# Patient Record
Sex: MALE | Race: OTHER
[De-identification: names, ages, dates, MRNs, and addresses within clinical notes are randomized per-mention and may not be internally consistent; named-entity substitution may affect disease eponyms.]

---

## 2020-04-26 ENCOUNTER — MESSAGE (OUTPATIENT)
Age: 60
End: 2020-04-26

## 2020-05-05 ENCOUNTER — APPOINTMENT (OUTPATIENT)
Dept: DISASTER EMERGENCY | Facility: CLINIC | Age: 60
End: 2020-05-05

## 2020-05-06 ENCOUNTER — APPOINTMENT (OUTPATIENT)
Dept: DISASTER EMERGENCY | Facility: CLINIC | Age: 60
End: 2020-05-06

## 2020-05-07 LAB
SARS-COV-2 IGG SERPL IA-ACNC: 0.1 INDEX
SARS-COV-2 IGG SERPL QL IA: NEGATIVE

## 2021-02-18 PROBLEM — Z00.00 ENCOUNTER FOR PREVENTIVE HEALTH EXAMINATION: Status: ACTIVE | Noted: 2021-02-18

## 2021-11-22 ENCOUNTER — APPOINTMENT (OUTPATIENT)
Dept: UROLOGY | Facility: CLINIC | Age: 61
End: 2021-11-22
Payer: COMMERCIAL

## 2021-11-22 VITALS
HEART RATE: 73 BPM | HEIGHT: 67 IN | WEIGHT: 232 LBS | BODY MASS INDEX: 36.41 KG/M2 | DIASTOLIC BLOOD PRESSURE: 83 MMHG | SYSTOLIC BLOOD PRESSURE: 171 MMHG

## 2021-11-22 DIAGNOSIS — I10 ESSENTIAL (PRIMARY) HYPERTENSION: ICD-10-CM

## 2021-11-22 DIAGNOSIS — E07.9 DISORDER OF THYROID, UNSPECIFIED: ICD-10-CM

## 2021-11-22 DIAGNOSIS — Z78.9 OTHER SPECIFIED HEALTH STATUS: ICD-10-CM

## 2021-11-22 PROCEDURE — 99204 OFFICE O/P NEW MOD 45 MIN: CPT

## 2021-11-22 RX ORDER — LISINOPRIL 30 MG/1
TABLET ORAL
Refills: 0 | Status: ACTIVE | COMMUNITY

## 2021-11-22 RX ORDER — METFORMIN HYDROCHLORIDE 625 MG/1
TABLET ORAL
Refills: 0 | Status: ACTIVE | COMMUNITY

## 2021-11-22 RX ORDER — SIMVASTATIN 80 MG/1
TABLET, FILM COATED ORAL
Refills: 0 | Status: ACTIVE | COMMUNITY

## 2021-11-22 NOTE — PHYSICAL EXAM
[General Appearance - Well Developed] : well developed [General Appearance - Well Nourished] : well nourished [Normal Appearance] : normal appearance [Well Groomed] : well groomed [General Appearance - In No Acute Distress] : no acute distress [Heart Rate And Rhythm] : Heart rate and rhythm were normal [Respiration, Rhythm And Depth] : normal respiratory rhythm and effort [Exaggerated Use Of Accessory Muscles For Inspiration] : no accessory muscle use [Auscultation Breath Sounds / Voice Sounds] : lungs were clear to auscultation bilaterally [Abdomen Soft] : soft [Abdomen Tenderness] : non-tender [Abdomen Hernia] : no hernia was discovered [Costovertebral Angle Tenderness] : no ~M costovertebral angle tenderness [Urethral Meatus] : meatus normal [Penis Abnormality] : normal circumcised penis [Urinary Bladder Findings] : the bladder was normal on palpation [Scrotum] : the scrotum was normal [Testes Tenderness] : no tenderness of the testes [Testes Mass (___cm)] : there were no testicular masses [Prostate Enlargement] : the prostate was not enlarged [Prostate Tenderness] : the prostate was not tender [No Prostate Nodules] : no prostate nodules [Normal Station and Gait] : the gait and station were normal for the patient's age [] : no rash [No Focal Deficits] : no focal deficits [Oriented To Time, Place, And Person] : oriented to person, place, and time [Affect] : the affect was normal [Mood] : the mood was normal [Not Anxious] : not anxious [Femoral Lymph Nodes Enlarged Bilaterally] : femoral [Inguinal Lymph Nodes Enlarged Bilaterally] : inguinal [Epididymis] : the epididymides were normal [FreeTextEntry1] : Dermatophytosis noted to the bilateral inguinal crease.  Peyronie's with corporal atrophy left greater than right.  Digital rectal examination reveals small prostate with intact BC reflex

## 2021-11-22 NOTE — LETTER HEADER
[FreeTextEntry3] : Svitlana Sal M.D.\par Director of Urology\par [facility]\par [address] [place] [address], [address]\[place] [address]

## 2021-11-22 NOTE — LETTER BODY
[Dear  ___] : Dear  [unfilled], [Consult Letter:] : I had the pleasure of evaluating your patient, [unfilled]. [Please see my note below.] : Please see my note below. [Consult Closing:] : Thank you very much for allowing me to participate in the care of this patient.  If you have any questions, please do not hesitate to contact me. [Sincerely,] : Sincerely, [FreeTextEntry2] : Nic Gage MD\par [address] [facility]\[place] [address]

## 2021-11-22 NOTE — ASSESSMENT
[FreeTextEntry1] : He has gynecomastia and is obese.  I do not see estradiol measurements.  He was using AndroGel 2 pumps a day however, is obtaining blood work off of medication.  He has meds left so he will continue on 2 pumps a day and get blood work, including estradiol, on the medication so we can see what his true levels are.  We will adjust accordingly.\par \par Concerning his erectile dysfunction, we will have him obtain [place] criteria and if cleared discuss PDE 5 inhibitors once we are sure we have not had good testosterone level.

## 2021-11-22 NOTE — END OF VISIT
[FreeTextEntry3] : I, Dr. Sal, personally performed the evaluation and management (E/M) services for this new patient.  That E/M includes conducting the initial examination, assessing all conditions, and establishing the plan of care.  Today, my ACP, Deshawn Kelly, was here to observe my evaluation and management services for this patient to be followed going forward

## 2021-11-22 NOTE — HISTORY OF PRESENT ILLNESS
[Erectile Dysfunction] : Erectile Dysfunction [None] : None [FreeTextEntry1] : Chacho is a 61-year-old male, with a history of prediabetes, who was sent for consultation regarding low testosterone and erectile dysfunction.\par \par Approximately 5 years ago patient began experiencing decreased libido/energy with erectile dysfunction.  He presented to his PCP, in [place], at the time.  Blood work demonstrated low testosterone and he was started on AndroGel 2 pumps per day.  Reports significant improvement with his symptoms with a near resolution of his erectile dysfunction and improvement in his libido and general disposition.\par \par When he moved to [place] he noticed, over the past 6 months, increasing difficulty in maintaining an erection.  He said when he eventually obtained an erection it was of decreased rigidity.  He presented to his new PCP who wants him to follow-up with urology regarding his ED and testosterone\par \par He obtained blood work in July however, it was off of testosterone.\par \par He denies any issues with voiding at this time. [Currently Experiencing ___] :  [unfilled] [Urinary Incontinence] : no urinary incontinence [Urinary Retention] : no urinary retention [Urinary Urgency] : no urinary urgency [Urinary Frequency] : no urinary frequency [Nocturia] : no nocturia [Straining] : no straining [Weak Stream] : no weak stream [Intermittency] : no intermittency [Post-Void Dribbling] : no post-void dribbling [Dysuria] : no dysuria [Hematuria - Gross] : no gross hematuria

## 2021-12-24 ENCOUNTER — EMERGENCY (EMERGENCY)
Facility: HOSPITAL | Age: 61
LOS: 0 days | Discharge: HOME | End: 2021-12-24
Attending: EMERGENCY MEDICINE | Admitting: EMERGENCY MEDICINE
Payer: COMMERCIAL

## 2021-12-24 VITALS
RESPIRATION RATE: 18 BRPM | TEMPERATURE: 98 F | HEART RATE: 84 BPM | DIASTOLIC BLOOD PRESSURE: 99 MMHG | SYSTOLIC BLOOD PRESSURE: 164 MMHG | OXYGEN SATURATION: 99 %

## 2021-12-24 DIAGNOSIS — K08.89 OTHER SPECIFIED DISORDERS OF TEETH AND SUPPORTING STRUCTURES: ICD-10-CM

## 2021-12-24 DIAGNOSIS — U07.1 COVID-19: ICD-10-CM

## 2021-12-24 DIAGNOSIS — K04.7 PERIAPICAL ABSCESS WITHOUT SINUS: ICD-10-CM

## 2021-12-24 PROCEDURE — 99284 EMERGENCY DEPT VISIT MOD MDM: CPT

## 2021-12-24 RX ORDER — AMOXICILLIN 250 MG/5ML
1 SUSPENSION, RECONSTITUTED, ORAL (ML) ORAL
Qty: 30 | Refills: 0
Start: 2021-12-24 | End: 2022-01-02

## 2021-12-24 RX ORDER — IBUPROFEN 200 MG
1 TABLET ORAL
Qty: 28 | Refills: 0
Start: 2021-12-24 | End: 2021-12-30

## 2021-12-24 NOTE — ED PROVIDER NOTE - NSFOLLOWUPCLINICS_GEN_ALL_ED_FT
[facility]  Medicine  [address], [place]   Phone: [phone]  Fax:

## 2021-12-24 NOTE — ED PROVIDER NOTE - OBJECTIVE STATEMENT
61 year old male, no significant pmhx presenting with L upper dental pain s/p testing positive for COVID. Pain is achy, non-radiating, worse with chewing, no palliative factors, moderate severity. Denies dyspnea, voice changes, drooling, N/V or any other complaints.

## 2021-12-24 NOTE — CONSULT NOTE ADULT - SUBJECTIVE AND OBJECTIVE BOX
Patient is a 61y old  Male who presents with a chief complaint of dental pain on the upper left that started after he was tested positive for Covid on 12/21.    HPI: Patient reported having previous similar experience of swelling and infection at the same site. He was given antibiotic and the swelling went away.      PAST MEDICAL & SURGICAL HISTORY:    ( -  ) heart valve replacement  ( -  ) joint replacement  ( -  ) pregnancy    MEDICATIONS  (STANDING):    MEDICATIONS  (PRN): Patient reported taking Advil for pain      Allergies: No known drug allergy    *SOCIAL HISTORY: (  - ) Tobacco; ( -  ) ETOH    *Last Dental Visit: unknown    Vital Signs Last 24 Hrs  T(C): 36.6 (24 Dec 2021 09:53), Max: 36.6 (24 Dec 2021 09:53)  T(F): 97.9 (24 Dec 2021 09:53), Max: 97.9 (24 Dec 2021 09:53)  HR: 84 (24 Dec 2021 09:53) (84 - 84)  BP: 164/99 (24 Dec 2021 09:53) (164/99 - 164/99)  BP(mean): --  RR: 18 (24 Dec 2021 09:53) (18 - 18)  SpO2: 99% (24 Dec 2021 09:53) (99% - 99%)      EOE:  TMJ ( -  ) clicks                     ( -  ) pops                     ( -  ) crepitus             Mandible <<FROM>>             Facial bones and MOM <<grossly intact>>             ( -  ) trismus             ( -  ) lymphadenopathy             ( -  ) swelling             ( -  ) asymmetry             ( +  ) palpation: tenderness on the left inferior orbital area             (  - ) dyspnea             (  - ) dysphagia             (  - ) loss of consciousness    IOE:  <<permanen>> dentition: <<multiple missing teeth>>           hard/soft palate:  (  - ) palatal torus, <<No pathology noted>>           tongue/FOM <<No pathology noted>>           labial/buccal mucosa <<No pathology noted>>           ( -  ) percussion           ( +  ) palpation: buccal of #15           ( -  ) swelling            ( +  ) abscess: associated with #15           ( +  ) sinus tract: buccal of #15      *DENTAL RADIOGRAPHS: n/a    *ASSESSMENT: Patient is a 61y old  Male who presents with a chief complaint of dental pain on the upper left that started after he went to get tested on 12/19 and received his test result on 12/21 showing that he is positive for COVID-19. Patient reported having previous similar experience of swelling and infection at the same site. He was given antibiotic and the swelling went away. Extraoral exam noted slight tenderness on palpation at the left inferior orbital area. Intraoral exam noted multiple missing teeth on the upper left area. #15 has existing full coverage crown. Draining abscess noted on the buccal and mesial to #15 with pus expressed upon palpation. Patient has clinically missing teeth anterior and posterior to #15. Explained to the patient that the abscess is draining by itself already. Patient will need to follow up with primary dentist for evaluation and treatment. Patient understands and agrees.    *PLAN: Antibiotic and analgesic medication. Patient to follow up with his primary dentist after recovering from COVID.     RECOMMENDATIONS:  1) Prescribe Amoxicillin and Ibuprofen dosage as per emergency department recommendation.   2) Dental F/U with outpatient dentist for evaluation of offending tooth and comprehensive dental care.   3) If any difficulty swallowing/breathing, fever occur, return to ER.     Rebecca Warner DDS, pager #[phone]

## 2021-12-24 NOTE — ED PROVIDER NOTE - PHYSICAL EXAMINATION
Vital Signs: I have reviewed the initial vital signs.  Constitutional: NAD, well-nourished, appears stated age, no acute distress.  HEENT: Airway patent, moist MM, no erythema/swelling/deformity of oral structures. EOMI, PERRLA. (+) tenderness to tooth #15 with (+) area of fluctuance  CV: regular rate, regular rhythm, well-perfused extremities, 2+ b/l DP and radial pulses equal.  Lungs: BCTA, no increased WOB.  ABD: NTND, no guarding or rebound, no pulsatile mass, no hernias.   MSK: Neck supple, nontender, nl ROM, no stepoff. Chest nontender. Back nontender in TLS spine or to b/l bony structures or flanks. Ext nontender, nl rom, no deformity.   INTEG: Skin warm, dry, no rash.  NEURO: A&Ox3, normal strength, nl sensation throughout, normal speech.   PSYCH: Calm, cooperative, normal affect and interaction.

## 2021-12-24 NOTE — ED PROVIDER NOTE - CLINICAL SUMMARY MEDICAL DECISION MAKING FREE TEXT BOX
Patient presented with atraumatic L upper tooth pain. Otherwise afebrile, HD stable, tolerating PO at home, airway patent, clearing secretions without difficulty, speaking full sentences, no tongue elevation or cervical LAD, uvula midline. (+) abscess on exam. Consulted dental who evaluated patient in ED and performed I+D. Given the above, will discharge home with PO abx and outpatient dental follow up as per dental recs. Patient agreeable with plan. Agrees to return to ED for any new or worsening symptoms.

## 2021-12-24 NOTE — ED PROVIDER NOTE - PATIENT PORTAL LINK FT
You can access the FollowMyHealth Patient Portal offered by [facility] by registering at the following website: [email or web address]. By joining [facility]’s FollowMyHealth portal, you will also be able to view your health information using other applications (apps) compatible with our system.

## 2022-01-24 ENCOUNTER — APPOINTMENT (OUTPATIENT)
Dept: UROLOGY | Facility: CLINIC | Age: 62
End: 2022-01-24
Payer: COMMERCIAL

## 2022-01-24 VITALS
SYSTOLIC BLOOD PRESSURE: 153 MMHG | WEIGHT: 228 LBS | DIASTOLIC BLOOD PRESSURE: 74 MMHG | HEIGHT: 67 IN | BODY MASS INDEX: 35.79 KG/M2 | HEART RATE: 68 BPM

## 2022-01-24 PROCEDURE — 99214 OFFICE O/P EST MOD 30 MIN: CPT

## 2022-01-24 NOTE — ASSESSMENT
[FreeTextEntry1] : His hormone levels on 2 pumps per day are excellent.  We got [place] criteria classification of 1.\par \par We will renew the AndroGel at 2 pumps per day, we will get him a 3-month prescription.\par \par With respect to his ED there are 2 main drugs we use generic Viagra called sildenafil in anywhere from 20 to 100 mg 30 to 60 minutes before sexual activity preferably on an empty stomach the advantage of sildenafil is cost.  The other drug is Cialis AKA tadalafil which can be obtained from compounding pharmacies but is probably 7 times the price.  Its advantages that food does not matter but it has to be taken at least an hour before activity coasted takes a while to get into the bloodstream.  Once in it can last up to 24 or even 36 hours while sildenafil last anywhere from 4 up to 12.  They both have similar risks of nonischemic arterial optic neuropathy, ringing in the ears AKA tinnitus with the possibility of nasal congestion and headache while sildenafil has a high risk of low vision and Cialis has a higher risk of muscle ache.  This is all discussed with him and he chooses to try low-dose sildenafil first as he is concerned with potential side effects.  He will start at the 20 mg dose work his way up as tolerated needed up to 100 mg and when he comes back in he will tell us how he is doing.  If he wants to get more he will come in sooner if not we will see him in 3 months after repeat bloods\par \par As far as his dermatophytosis he tells me the Clotrimazole is working really well we will renew it

## 2022-01-24 NOTE — HISTORY OF PRESENT ILLNESS
[FreeTextEntry1] : Chacho is a 61-year-old male, with a history of prediabetes, who was sent for consultation regarding low testosterone and erectile dysfunction.\par \par Approximately 5 years ago patient began experiencing decreased libido/energy with erectile dysfunction. He presented to his PCP, in [place], at the time. Blood work demonstrated low testosterone and he was started on AndroGel 2 pumps per day. Reports significant improvement with his symptoms with a near resolution of his erectile dysfunction and improvement in his libido and general disposition.\par \par Patient presents to office today to review his blood work and [place] Criteria. \par Patient states that he feels that his erections and libido has decreased due to at home stress. Patient requests medication to help with erections. \par As for AndroGel, patient states he is running low on medication. \par \par Patient denies any urinary complaints. Reports good urinary stream. Denies dysuria and gross hematuria. \par \par Of note, patient was Covid 19 positive around 12/25/2021. Patient reports he currently feels well. \par \par With respect to his dermatophytosis he is doing well with the clotrimazole

## 2022-01-24 NOTE — LETTER BODY
[Dear  ___] : Dear  [unfilled], [Courtesy Letter:] : I had the pleasure of seeing your patient, [unfilled], in my office today. [Please see my note below.] : Please see my note below. [Sincerely,] : Sincerely, [FreeTextEntry2] : Nic Gage MD\par [address] [facility]\[place] [address]

## 2022-01-24 NOTE — LETTER HEADER
[FreeTextEntry3] : Svitlana Sal M.D.\par Director Emeritus of Urology\par [facility]\par [address] [place] [address], [address]\[place] [address]

## 2022-03-23 ENCOUNTER — TRANSCRIPTION ENCOUNTER (OUTPATIENT)
Age: 62
End: 2022-03-23

## 2022-04-25 ENCOUNTER — APPOINTMENT (OUTPATIENT)
Dept: UROLOGY | Facility: CLINIC | Age: 62
End: 2022-04-25
Payer: COMMERCIAL

## 2022-04-25 VITALS
HEART RATE: 71 BPM | DIASTOLIC BLOOD PRESSURE: 85 MMHG | SYSTOLIC BLOOD PRESSURE: 153 MMHG | BODY MASS INDEX: 36.57 KG/M2 | WEIGHT: 233 LBS | HEIGHT: 67 IN

## 2022-04-25 DIAGNOSIS — R73.03 PREDIABETES.: ICD-10-CM

## 2022-04-25 PROCEDURE — 99214 OFFICE O/P EST MOD 30 MIN: CPT

## 2022-04-25 RX ORDER — BENZONATATE 100 MG/1
100 CAPSULE ORAL
Qty: 20 | Refills: 0 | Status: COMPLETED | COMMUNITY
Start: 2021-12-23

## 2022-04-25 RX ORDER — PREDNISONE 20 MG/1
20 TABLET ORAL
Qty: 10 | Refills: 0 | Status: COMPLETED | COMMUNITY
Start: 2021-11-24

## 2022-04-25 RX ORDER — CROMOLYN SODIUM 40 MG/ML
4 SOLUTION/ DROPS OPHTHALMIC
Qty: 10 | Refills: 0 | Status: COMPLETED | COMMUNITY
Start: 2022-03-16

## 2022-04-25 RX ORDER — AMOXICILLIN 500 MG/1
500 TABLET, FILM COATED ORAL
Qty: 30 | Refills: 0 | Status: COMPLETED | COMMUNITY
Start: 2021-12-24

## 2022-04-25 RX ORDER — ONDANSETRON 8 MG/1
8 TABLET, ORALLY DISINTEGRATING ORAL
Qty: 6 | Refills: 0 | Status: COMPLETED | COMMUNITY
Start: 2022-03-23

## 2022-04-25 RX ORDER — IBUPROFEN 600 MG/1
600 TABLET, FILM COATED ORAL
Qty: 28 | Refills: 0 | Status: ACTIVE | COMMUNITY
Start: 2021-12-24

## 2022-04-25 RX ORDER — SILDENAFIL 20 MG/1
20 TABLET ORAL
Qty: 10 | Refills: 0 | Status: COMPLETED | OUTPATIENT
Start: 2022-01-24 | End: 2022-04-25

## 2022-04-25 RX ORDER — CICLOPIROX OLAMINE 7.7 MG/G
0.77 CREAM TOPICAL
Qty: 90 | Refills: 0 | Status: COMPLETED | COMMUNITY
Start: 2021-03-19

## 2022-04-25 RX ORDER — FAMOTIDINE 20 MG/1
20 TABLET, FILM COATED ORAL
Qty: 28 | Refills: 0 | Status: COMPLETED | COMMUNITY
Start: 2022-03-23

## 2022-04-25 RX ORDER — CLINDAMYCIN HYDROCHLORIDE 150 MG/1
150 CAPSULE ORAL
Qty: 21 | Refills: 0 | Status: COMPLETED | COMMUNITY
Start: 2022-01-27

## 2022-04-27 NOTE — HISTORY OF PRESENT ILLNESS
[Currently Experiencing ___] :  [unfilled] [Erectile Dysfunction] : Erectile Dysfunction [None] : None [FreeTextEntry1] : Chacho is a 61-year-old male born November 18, 1960 last seen January 24, 2022.  We have started him on sildenafil he is now taking 3 sometimes 4 of the 20 mg tablets, he says the more he takes a better release he is not having any side effects and as long as he is on the AndroGel, he is taking 2 pumps per day he has a normal libido.  He is still somewhat tired and wants to know if his testosterone levels are normalized.  Finally he has the dermatophytosis in the groin is coming back to warmer weather and he thinks he needs some more the clotrimazole. [Urinary Incontinence] : no urinary incontinence [Urinary Retention] : no urinary retention [Urinary Urgency] : no urinary urgency [Urinary Frequency] : no urinary frequency [Nocturia] : no nocturia [Straining] : no straining [Weak Stream] : no weak stream [Intermittency] : no intermittency [Post-Void Dribbling] : no post-void dribbling [Dysuria] : no dysuria [Hematuria - Gross] : no gross hematuria

## 2022-04-27 NOTE — LETTER HEADER
[FreeTextEntry3] : Svitlana Sal M.D.\par Director Emeritus of Urology\par [facility]\par [address] [place] [address], [address]\[place] [address]

## 2022-04-27 NOTE — ASSESSMENT
[FreeTextEntry1] : His numbers are fine, his fatigue is not hormonal he like to speak to his PCP, his weight is going up and I have cautioned him this is an anabolic steroid he has to watch his diet and do enough exercise to keep the weight off.  As it is his estradiol is a little over the maximum not enough that I would give him pills but it will have to be watched.  As far as his lower extremity edema I discussed that with his PCP.\par \par As far as his ED he is already taking 60 to 80 mg without side effects 100 mg would be a lot cheaper if he gets some side effects he can always shave off a little with a pill cutter.\par \par As far as the dermatophytosis it is beginning to come back a and renewal for the econazole was given

## 2022-04-27 NOTE — LETTER BODY
[Dear  ___] : Dear  [unfilled], [Courtesy Letter:] : I had the pleasure of seeing your patient, [unfilled], in my office today. [Please see my note below.] : Please see my note below. [Sincerely,] : Sincerely, [FreeTextEntry2] : Nic Gage MD\par [address] [facility]\[place] [address]

## 2022-04-27 NOTE — PHYSICAL EXAM
[General Appearance - Well Developed] : well developed [General Appearance - Well Nourished] : well nourished [Normal Appearance] : normal appearance [Well Groomed] : well groomed [General Appearance - In No Acute Distress] : no acute distress [Abdomen Soft] : soft [Abdomen Tenderness] : non-tender [Abdomen Hernia] : no hernia was discovered [Costovertebral Angle Tenderness] : no ~M costovertebral angle tenderness [Heart Rate And Rhythm] : Heart rate and rhythm were normal [] : no respiratory distress [Respiration, Rhythm And Depth] : normal respiratory rhythm and effort [Exaggerated Use Of Accessory Muscles For Inspiration] : no accessory muscle use [Auscultation Breath Sounds / Voice Sounds] : lungs were clear to auscultation bilaterally [Oriented To Time, Place, And Person] : oriented to person, place, and time [Affect] : the affect was normal [Mood] : the mood was normal [Not Anxious] : not anxious [Normal Station and Gait] : the gait and station were normal for the patient's age [FreeTextEntry1] : Moderate edema up to the upper calf

## 2022-08-01 ENCOUNTER — APPOINTMENT (OUTPATIENT)
Dept: UROLOGY | Facility: CLINIC | Age: 62
End: 2022-08-01

## 2022-08-01 VITALS
BODY MASS INDEX: 36.57 KG/M2 | DIASTOLIC BLOOD PRESSURE: 63 MMHG | SYSTOLIC BLOOD PRESSURE: 130 MMHG | WEIGHT: 233 LBS | HEART RATE: 73 BPM | HEIGHT: 67 IN

## 2022-08-01 PROCEDURE — 99214 OFFICE O/P EST MOD 30 MIN: CPT

## 2022-08-01 NOTE — PHYSICAL EXAM
[General Appearance - Well Developed] : well developed [General Appearance - Well Nourished] : well nourished [Normal Appearance] : normal appearance [Well Groomed] : well groomed [General Appearance - In No Acute Distress] : no acute distress [] : no respiratory distress [Respiration, Rhythm And Depth] : normal respiratory rhythm and effort [Exaggerated Use Of Accessory Muscles For Inspiration] : no accessory muscle use [Oriented To Time, Place, And Person] : oriented to person, place, and time [Affect] : the affect was normal [Mood] : the mood was normal [Not Anxious] : not anxious [Normal Station and Gait] : the gait and station were normal for the patient's age [No Focal Deficits] : no focal deficits [FreeTextEntry1] : Edema with venous stasis changes to the lower extremities bilaterally

## 2022-08-01 NOTE — LETTER HEADER
[FreeTextEntry3] : Svitlana Sal M.D.\par Director Emeritus of Urology\par [facility]\par [address] [place] [address], [address]\[place] [address]

## 2022-08-01 NOTE — LETTER BODY
[Dear  ___] : Dear  [unfilled], [Courtesy Letter:] : I had the pleasure of seeing your patient, [unfilled], in my office today. [Please see my note below.] : Please see my note below. [Sincerely,] : Sincerely, [FreeTextEntry2] : Nic Gage MD\par [address] [facility]\[place] [address]

## 2022-08-01 NOTE — ASSESSMENT
[FreeTextEntry1] : He is doing well at 2 pumps a day without adverse events.  His hormones are within normal physiologic range.  He will continue.  He will get blood in 2 and half months follow-up in 3 months for review.  \par \par With respect to his erectile dysfunction, he is doing well on sildenafil and will continue. \par \par Concerning his dermatophytosis, he will restart econazole and follow-up with dermatology.  We reinforced appropriate hygiene.

## 2022-08-01 NOTE — HISTORY OF PRESENT ILLNESS
[Currently Experiencing ___] :  [unfilled] [Erectile Dysfunction] : Erectile Dysfunction [None] : None [FreeTextEntry1] : Chacho is a 61-year-old male, born November 18, 2016\par Last seen on April 25, 2022 who we have been following for recurrent dermatophytosis, ED, and low testosterone.\par \par Currently he is managed on 2 pumps of AndroGel per day.  He reports good efficacy and without adverse events.\par \par Additionally he is taking 100 mg of sildenafil with good response.  He denies any adverse events associated.\par \par He has a history of recurrent dermatophytosis, managed with econazole, and he states his rash has now returned.  At his last visit we recommended dermatological consultation.  When he uses the econazole the rash goes away but when he stops it comes back please try keeping it clean washing it often but especially in this weather and the fact that he tends to sweat a lot it comes back.  He would like to know what else he can get\par  [Urinary Incontinence] : no urinary incontinence [Urinary Retention] : no urinary retention [Urinary Urgency] : no urinary urgency [Urinary Frequency] : no urinary frequency [Nocturia] : no nocturia [Straining] : no straining [Weak Stream] : no weak stream [Intermittency] : no intermittency [Post-Void Dribbling] : no post-void dribbling [Dysuria] : no dysuria [Hematuria - Gross] : no gross hematuria

## 2022-10-24 ENCOUNTER — APPOINTMENT (OUTPATIENT)
Dept: OTOLARYNGOLOGY | Facility: CLINIC | Age: 62
End: 2022-10-24

## 2022-10-24 DIAGNOSIS — H91.93 UNSPECIFIED HEARING LOSS, BILATERAL: ICD-10-CM

## 2022-10-24 PROCEDURE — 99204 OFFICE O/P NEW MOD 45 MIN: CPT | Mod: 25

## 2022-10-24 PROCEDURE — 31231 NASAL ENDOSCOPY DX: CPT

## 2022-10-24 NOTE — HISTORY OF PRESENT ILLNESS
[de-identified] : Patient presents clogged ears .   Has   ear discomfort  when  sun hits   ear .   Allergies ,  he is  on Flonase . Has been having nasal  congestion .  His symptoms have been going on for over a year. \par Occasionally has  been getting  dizziness and lightheaded for over a year .  Room starts spinning . \par He take flonase which helps.

## 2022-10-24 NOTE — ASSESSMENT
[FreeTextEntry1] : Audio next visit.\par \par I discussed the benefits of oral steroids for patient's current situation, and made the patient aware of side effects of systemic corticosteroids. I recommended a low sugar and low salt diet while on steroid treatment, while alerting patient about potential temporary increase in sugar levels and blood pressure. Patient aware of risk of impact on sleep quality and mood temporarily while on the medication.\par Patient to touch bases with PCP regarding oral steroids to check if needs to change dose of diabetes meds. \par

## 2022-10-25 ENCOUNTER — RESULT REVIEW (OUTPATIENT)
Age: 62
End: 2022-10-25

## 2022-11-04 ENCOUNTER — OUTPATIENT (OUTPATIENT)
Dept: OUTPATIENT SERVICES | Facility: HOSPITAL | Age: 62
LOS: 1 days | Discharge: HOME | End: 2022-11-04

## 2022-11-04 DIAGNOSIS — J33.9 NASAL POLYP, UNSPECIFIED: ICD-10-CM

## 2022-11-04 PROCEDURE — 70487 CT MAXILLOFACIAL W/DYE: CPT | Mod: 26

## 2022-11-09 ENCOUNTER — APPOINTMENT (OUTPATIENT)
Dept: UROLOGY | Facility: CLINIC | Age: 62
End: 2022-11-09

## 2022-11-09 VITALS
HEART RATE: 66 BPM | WEIGHT: 230 LBS | BODY MASS INDEX: 36.1 KG/M2 | HEIGHT: 67 IN | SYSTOLIC BLOOD PRESSURE: 144 MMHG | DIASTOLIC BLOOD PRESSURE: 66 MMHG

## 2022-11-09 PROCEDURE — 99214 OFFICE O/P EST MOD 30 MIN: CPT

## 2022-11-09 NOTE — LETTER HEADER
[FreeTextEntry3] : Svitlana Sal M.D.\par Director Emeritus of Urology\par [facility]\par [address] [place] [address], [address]\[place] [address]

## 2022-11-09 NOTE — ASSESSMENT
[FreeTextEntry1] : Blood work demonstrates normal physiologic levels.  But they did not want everything we wanted.  He is doing well we will continue sildenafil and testosterone.  He will obtain blood work and follow-up in 3 months.

## 2022-11-09 NOTE — HISTORY OF PRESENT ILLNESS
[Currently Experiencing ___] :  [unfilled] [Erectile Dysfunction] : Erectile Dysfunction [None] : None [FreeTextEntry1] : Chacho is a 61-year-old male born November 18, 1960 last seen on August 1, 2022 we have been following for low testosterone and erectile dysfunction.\par \par Currently he is managed on 2 pumps of AndroGel per day.  He reports good efficacy and without adverse events.\par \par Additionally he is taking 100 mg of sildenafil when the opportunity arises, with good efficacy without any adverse events.  [Urinary Incontinence] : no urinary incontinence [Urinary Retention] : no urinary retention [Urinary Urgency] : no urinary urgency [Urinary Frequency] : no urinary frequency [Nocturia] : no nocturia [Straining] : no straining [Weak Stream] : no weak stream [Intermittency] : no intermittency [Post-Void Dribbling] : no post-void dribbling [Dysuria] : no dysuria [Hematuria - Gross] : no gross hematuria

## 2022-11-09 NOTE — END OF VISIT
[FreeTextEntry3] : I, Dr. Sal, personally performed the evaluation and management (E/M) services for this established patient who presents today with (a) new problem(s)/exacerbation of (an) existing condition(s).  That E/M includes conducting the examination, assessing all new/exacerbated conditions, and establishing a new plan of care.  Today, my ACP, Deshawn Kelly was here to observe my evaluation and management services for this new problem/exacerbated condition to be followed going forward.

## 2022-11-09 NOTE — PHYSICAL EXAM
[General Appearance - Well Developed] : well developed [General Appearance - Well Nourished] : well nourished [Normal Appearance] : normal appearance [Well Groomed] : well groomed [General Appearance - In No Acute Distress] : no acute distress [] : no respiratory distress [Respiration, Rhythm And Depth] : normal respiratory rhythm and effort [Exaggerated Use Of Accessory Muscles For Inspiration] : no accessory muscle use [Oriented To Time, Place, And Person] : oriented to person, place, and time [Affect] : the affect was normal [Mood] : the mood was normal [Not Anxious] : not anxious [Normal Station and Gait] : the gait and station were normal for the patient's age [No Focal Deficits] : no focal deficits [FreeTextEntry1] : Mild lower extremity edema

## 2022-11-09 NOTE — LETTER BODY
[Dear  ___] : Dear  [unfilled], [Courtesy Letter:] : I had the pleasure of seeing your patient, [unfilled], in my office today. [Please see my note below.] : Please see my note below. [Sincerely,] : Sincerely, [FreeTextEntry2] : Nic Gage MD\par [address] [facility]\[place] [address]

## 2022-11-16 ENCOUNTER — RX RENEWAL (OUTPATIENT)
Age: 62
End: 2022-11-16

## 2022-11-16 ENCOUNTER — APPOINTMENT (OUTPATIENT)
Dept: OTOLARYNGOLOGY | Facility: CLINIC | Age: 62
End: 2022-11-16

## 2022-11-16 VITALS — WEIGHT: 230 LBS | BODY MASS INDEX: 36.1 KG/M2 | HEIGHT: 67 IN

## 2022-11-16 DIAGNOSIS — J34.89 OTHER SPECIFIED DISORDERS OF NOSE AND NASAL SINUSES: ICD-10-CM

## 2022-11-16 DIAGNOSIS — J33.9 NASAL POLYP, UNSPECIFIED: ICD-10-CM

## 2022-11-16 PROCEDURE — 31231 NASAL ENDOSCOPY DX: CPT

## 2022-11-16 PROCEDURE — 99214 OFFICE O/P EST MOD 30 MIN: CPT | Mod: 25

## 2022-11-16 NOTE — REASON FOR VISIT
[Subsequent Evaluation] : a subsequent evaluation for [FreeTextEntry2] : nasal polyposis, nasal obstruction, bilateral hearing loss

## 2022-11-16 NOTE — HISTORY OF PRESENT ILLNESS
[FreeTextEntry1] : Patient returns today following up on nasal polyposis, nasal obstruction, bilateral hearing loss.  Patient is feeling a little better. He can breathe better in the cold ear.  He went for a CT Maxillofacial 11/04/22.  He is here for the results.

## 2022-11-16 NOTE — ASSESSMENT
[FreeTextEntry1] : I personally reviewed, interpreted and discussed patient's CT images. polyps and chronic sinusitis.\par \par Discussed surgery, vs dupixent, vs budesonide. Pros and cons and risks explained. \par \par \par RTC in 2M

## 2023-01-04 ENCOUNTER — NON-APPOINTMENT (OUTPATIENT)
Age: 63
End: 2023-01-04

## 2023-01-18 ENCOUNTER — APPOINTMENT (OUTPATIENT)
Dept: OTOLARYNGOLOGY | Facility: CLINIC | Age: 63
End: 2023-01-18

## 2023-02-13 ENCOUNTER — RX RENEWAL (OUTPATIENT)
Age: 63
End: 2023-02-13

## 2023-02-13 ENCOUNTER — APPOINTMENT (OUTPATIENT)
Dept: UROLOGY | Facility: CLINIC | Age: 63
End: 2023-02-13
Payer: COMMERCIAL

## 2023-02-13 VITALS
WEIGHT: 228 LBS | TEMPERATURE: 97.4 F | RESPIRATION RATE: 18 BRPM | DIASTOLIC BLOOD PRESSURE: 80 MMHG | HEART RATE: 79 BPM | BODY MASS INDEX: 35.79 KG/M2 | HEIGHT: 67 IN | OXYGEN SATURATION: 98 % | SYSTOLIC BLOOD PRESSURE: 159 MMHG

## 2023-02-13 DIAGNOSIS — N52.9 MALE ERECTILE DYSFUNCTION, UNSPECIFIED: ICD-10-CM

## 2023-02-13 PROCEDURE — 99214 OFFICE O/P EST MOD 30 MIN: CPT

## 2023-02-13 RX ORDER — CLOTRIMAZOLE 10 MG/G
1 CREAM TOPICAL TWICE DAILY
Qty: 1 | Refills: 1 | Status: ACTIVE | COMMUNITY
Start: 2021-11-22 | End: 1900-01-01

## 2023-02-13 RX ORDER — BUDESONIDE 0.5 MG/2ML
0.5 INHALANT ORAL
Qty: 180 | Refills: 0 | Status: ACTIVE | COMMUNITY
Start: 2022-11-16 | End: 1900-01-01

## 2023-02-13 NOTE — PHYSICAL EXAM
[General Appearance - Well Developed] : well developed [General Appearance - Well Nourished] : well nourished [Normal Appearance] : normal appearance [Well Groomed] : well groomed [General Appearance - In No Acute Distress] : no acute distress [] : no respiratory distress [Respiration, Rhythm And Depth] : normal respiratory rhythm and effort [Exaggerated Use Of Accessory Muscles For Inspiration] : no accessory muscle use [Oriented To Time, Place, And Person] : oriented to person, place, and time [Affect] : the affect was normal [Mood] : the mood was normal [Not Anxious] : not anxious [Normal Station and Gait] : the gait and station were normal for the patient's age [No Focal Deficits] : no focal deficits [FreeTextEntry1] : Bilateral lower extremity edema, mild

## 2023-02-13 NOTE — LETTER BODY
[Dear  ___] : Dear  [unfilled], [Courtesy Letter:] : I had the pleasure of seeing your patient, [unfilled], in my office today. [Please see my note below.] : Please see my note below. [Sincerely,] : Sincerely, [FreeTextEntry2] : Nic Gage MD\par [address] [facility]\[place] [address]

## 2023-02-13 NOTE — HISTORY OF PRESENT ILLNESS
[Currently Experiencing ___] :  [unfilled] [Erectile Dysfunction] : Erectile Dysfunction [None] : None [FreeTextEntry1] : Chacho is a 62-year-old male, born 1960 last seen 11/09/2022 who we have been following for low testosterone and erectile dysfunction.\par \par Currently he is managed on 2 pumps a day of AndroGel with good efficacy and without adverse effects.  He presents today to review his blood work however, he obtained his blood work 2 to 3 days off of medication as he had a sinus infection and decided he should not take the TRT when he had the sinus infection (???).\par \par Additionally he is managed with sildenafil 100 mg when needed, with good efficacy without adverse events [Urinary Incontinence] : no urinary incontinence [Urinary Retention] : no urinary retention [Urinary Urgency] : no urinary urgency [Urinary Frequency] : no urinary frequency [Nocturia] : no nocturia [Straining] : no straining [Weak Stream] : no weak stream [Intermittency] : no intermittency [Post-Void Dribbling] : no post-void dribbling [Dysuria] : no dysuria [Hematuria - Gross] : no gross hematuria

## 2023-02-13 NOTE — LETTER HEADER
[FreeTextEntry3] : Svitlana Sal M.D.\par Director Emeritus of Urology\par [facility]\par [address] [place] [address], [address]\[place] [address]

## 2023-02-13 NOTE — ASSESSMENT
[FreeTextEntry1] : His blood work is off of testosterone gel, as he was not feeling well when he obtained it.  We will resend the gel and he will continue 2 pumps a day.  Get blood work in 6 weeks and follow-up for review\par We discussed that he should not stop the medication when he has confounding disease processes without talking to his doctors

## 2023-05-15 ENCOUNTER — APPOINTMENT (OUTPATIENT)
Dept: UROLOGY | Facility: CLINIC | Age: 63
End: 2023-05-15
Payer: COMMERCIAL

## 2023-05-15 VITALS
DIASTOLIC BLOOD PRESSURE: 61 MMHG | HEART RATE: 70 BPM | WEIGHT: 224 LBS | HEIGHT: 67 IN | SYSTOLIC BLOOD PRESSURE: 112 MMHG | BODY MASS INDEX: 35.16 KG/M2

## 2023-05-15 DIAGNOSIS — Z87.891 PERSONAL HISTORY OF NICOTINE DEPENDENCE: ICD-10-CM

## 2023-05-15 DIAGNOSIS — Z78.9 OTHER SPECIFIED HEALTH STATUS: ICD-10-CM

## 2023-05-15 PROCEDURE — 99214 OFFICE O/P EST MOD 30 MIN: CPT

## 2023-05-15 RX ORDER — FLUTICASONE PROPIONATE 50 UG/1
50 SPRAY, METERED NASAL
Qty: 16 | Refills: 0 | Status: ACTIVE | COMMUNITY
Start: 2023-03-07

## 2023-05-15 RX ORDER — LEVOTHYROXINE SODIUM 150 UG/1
150 TABLET ORAL
Qty: 90 | Refills: 0 | Status: ACTIVE | COMMUNITY
Start: 2023-03-27

## 2023-05-15 RX ORDER — NIFEDIPINE 60 MG/1
60 TABLET, FILM COATED, EXTENDED RELEASE ORAL
Qty: 90 | Refills: 0 | Status: COMPLETED | COMMUNITY
Start: 2021-06-12 | End: 2023-05-15

## 2023-05-15 RX ORDER — TESTOSTERONE 10 MG/G
GEL TRANSDERMAL
Refills: 0 | Status: COMPLETED | COMMUNITY
End: 2023-05-15

## 2023-05-15 RX ORDER — NIFEDIPINE 90 MG/1
90 TABLET, EXTENDED RELEASE ORAL
Qty: 90 | Refills: 0 | Status: ACTIVE | COMMUNITY
Start: 2023-03-27

## 2023-05-15 RX ORDER — CEFDINIR 300 MG/1
300 CAPSULE ORAL
Qty: 14 | Refills: 0 | Status: ACTIVE | COMMUNITY
Start: 2023-03-20

## 2023-05-15 RX ORDER — LEVOTHYROXINE SODIUM 175 UG/1
175 TABLET ORAL
Qty: 90 | Refills: 0 | Status: COMPLETED | COMMUNITY
Start: 2022-02-08 | End: 2023-05-15

## 2023-05-15 RX ORDER — PROMETHAZINE HYDROCHLORIDE AND DEXTROMETHORPHAN HYDROBROMIDE ORAL SOLUTION 15; 6.25 MG/5ML; MG/5ML
6.25-15 SOLUTION ORAL
Qty: 140 | Refills: 0 | Status: ACTIVE | COMMUNITY
Start: 2023-01-05

## 2023-05-15 RX ORDER — METHYLPREDNISOLONE 2 MG/1
2 TABLET ORAL DAILY
Qty: 3 | Refills: 0 | Status: COMPLETED | COMMUNITY
Start: 2022-10-24 | End: 2023-05-15

## 2023-05-15 RX ORDER — ACETAMINOPHEN AND CODEINE PHOSPHATE 300; 30 MG/1; MG/1
300-30 TABLET ORAL
Qty: 30 | Refills: 0 | Status: ACTIVE | COMMUNITY
Start: 2023-03-20

## 2023-05-15 RX ORDER — TOBRAMYCIN 3 MG/ML
0.3 SOLUTION/ DROPS OPHTHALMIC
Qty: 5 | Refills: 0 | Status: COMPLETED | COMMUNITY
Start: 2023-03-27

## 2023-05-15 RX ORDER — NAPROXEN 500 MG/1
500 TABLET ORAL
Qty: 60 | Refills: 0 | Status: COMPLETED | COMMUNITY
Start: 2022-03-09 | End: 2023-05-15

## 2023-05-15 NOTE — LETTER BODY
[Dear  ___] : Dear  [unfilled], [Courtesy Letter:] : I had the pleasure of seeing your patient, [unfilled], in my office today. [Please see my note below.] : Please see my note below. [Sincerely,] : Sincerely, [FreeTextEntry2] : Nic Gage MD\par [address] [facility]\[place] [address]

## 2023-05-15 NOTE — ASSESSMENT
[FreeTextEntry1] : His numbers and his exam is without change his fatigue is not due to hormonal issues and also collateral with his PCP.  From my viewpoint he will stay on medication and see us in 3 months with bloods before

## 2023-05-15 NOTE — PHYSICAL EXAM
[General Appearance - Well Developed] : well developed [General Appearance - Well Nourished] : well nourished [Normal Appearance] : normal appearance [Well Groomed] : well groomed [General Appearance - In No Acute Distress] : no acute distress [Abdomen Soft] : soft [Abdomen Tenderness] : non-tender [Costovertebral Angle Tenderness] : no ~M costovertebral angle tenderness [Heart Rate And Rhythm] : Heart rate and rhythm were normal [FreeTextEntry1] : Moderate edema up to the upper calf [] : no respiratory distress [Respiration, Rhythm And Depth] : normal respiratory rhythm and effort [Exaggerated Use Of Accessory Muscles For Inspiration] : no accessory muscle use [Normal Station and Gait] : the gait and station were normal for the patient's age [No Focal Deficits] : no focal deficits

## 2023-05-15 NOTE — HISTORY OF PRESENT ILLNESS
[FreeTextEntry1] : Chacho is a 62-year-old male, born 1960 last seen February 13, 2023 who we have been following for low testosterone and erectile dysfunction.\par \par Currently he is managed on 2 pumps a day of AndroGel with good efficacy and without adverse effects.  He presents today to review his blood work this time he got the blood work while on the medication.  He tells me that he feels better but he still tired and he wonders if the medication is working.  He understands that there could be other reasons for being tired he just wants to know if the testosterone is it. \par \par Good as far as his sex life his wife is overworked, his mother is in assisted living and they just have not tried.  The last time he tried over a month ago his penis did work but this is just another thing for him to worry about. [Currently Experiencing ___] :  [unfilled] [Urinary Incontinence] : no urinary incontinence [Urinary Retention] : no urinary retention [Urinary Urgency] : no urinary urgency [Urinary Frequency] : no urinary frequency [Nocturia] : no nocturia [Straining] : no straining [Weak Stream] : no weak stream [Intermittency] : no intermittency [Post-Void Dribbling] : no post-void dribbling [Erectile Dysfunction] : Erectile Dysfunction [Dysuria] : no dysuria [Hematuria - Gross] : no gross hematuria [Fatigue] : fatigue [None] : None

## 2023-05-15 NOTE — LETTER HEADER
[FreeTextEntry3] : Svitlana Sal M.D.\par Director Emeritus of Urology\par [facility]\par [address] [place] [address], [address]\[place] [address]

## 2023-08-28 ENCOUNTER — APPOINTMENT (OUTPATIENT)
Dept: UROLOGY | Facility: CLINIC | Age: 63
End: 2023-08-28
Payer: COMMERCIAL

## 2023-08-28 VITALS
HEIGHT: 67 IN | TEMPERATURE: 98 F | HEART RATE: 70 BPM | SYSTOLIC BLOOD PRESSURE: 129 MMHG | WEIGHT: 199 LBS | BODY MASS INDEX: 31.23 KG/M2 | DIASTOLIC BLOOD PRESSURE: 64 MMHG

## 2023-08-28 PROCEDURE — 99214 OFFICE O/P EST MOD 30 MIN: CPT

## 2023-08-28 NOTE — PHYSICAL EXAM
[General Appearance - Well Developed] : well developed [General Appearance - Well Nourished] : well nourished [Normal Appearance] : normal appearance [Well Groomed] : well groomed [General Appearance - In No Acute Distress] : no acute distress [Abdomen Soft] : soft [Abdomen Tenderness] : non-tender [Costovertebral Angle Tenderness] : no ~M costovertebral angle tenderness [Heart Rate And Rhythm] : Heart rate and rhythm were normal [FreeTextEntry1] : Moderate edema up to the upper calf no chnage [] : no respiratory distress [Respiration, Rhythm And Depth] : normal respiratory rhythm and effort [Exaggerated Use Of Accessory Muscles For Inspiration] : no accessory muscle use [Oriented To Time, Place, And Person] : oriented to person, place, and time [Affect] : the affect was normal [Mood] : the mood was normal [Not Anxious] : not anxious [Normal Station and Gait] : the gait and station were normal for the patient's age

## 2023-08-28 NOTE — LETTER BODY
[Dear  ___] : Dear  [unfilled], [Courtesy Letter:] : I had the pleasure of seeing your patient, [unfilled], in my office today. [Please see my note below.] : Please see my note below. [Sincerely,] : Sincerely, [FreeTextEntry2] : Delphine Garcia MD [address] [place] [id number]

## 2023-08-28 NOTE — ASSESSMENT
[FreeTextEntry1] : His hormones are normal, his exam is stable, he wants to retry the sildenafil at the 100 mg as he is not sure if he ever filled that prescription if this does not work we can try Cialis.  He has no interest in self injection and definitely no interest in surgery.  If things go well we will keep him on the current protocol we will reorder the AndroGel once in 2-1/2 months and see me in 3.  If he decides the sildenafil is not to his liking and he wants to try the Cialis he will let us know  Risk benefits of the sildenafil as well of testosterone replacement were reviewed he had no questions

## 2023-08-28 NOTE — LETTER HEADER
[FreeTextEntry3] : Svitlana Sal MD  [address] [place] [address], [address] [place] [id number]

## 2023-08-28 NOTE — HISTORY OF PRESENT ILLNESS
[FreeTextEntry1] : Chacho is a 62-year-old male born November 18, 1960 last seen May 15, 2023.  We will follow him Dermatophytosis for which she uses econazole he says it is working given that he was in [place] for part of the summer he needed it a little more than some and he wants a refill  He has low testosterone being treated with 2 pumps per day of AndroGel he had bloods done on August 19 and is here for review and refill  Finally, when he has testosterone on board he is using sildenafil at the 100 mg dose is not working and he wants to know options. He has not tried cialis..  He tells me there has been no changes in his medical conditions and in general he feels good  Blood test were done August 19, 2023 with him on 2 pumps per day Total testosterone was 397 Free testosterone was 150 Bioavailable testosterone was 216 Sex hormone binding globulin of 11 explains the relatively low total testosterone   Function tests were normal Hemoglobin was 14.3 with a platelet count of 381,000 Estradiol was 23

## 2023-12-21 ENCOUNTER — APPOINTMENT (OUTPATIENT)
Dept: UROLOGY | Facility: CLINIC | Age: 63
End: 2023-12-21
Payer: COMMERCIAL

## 2023-12-21 VITALS
BODY MASS INDEX: 35.94 KG/M2 | TEMPERATURE: 97.2 F | SYSTOLIC BLOOD PRESSURE: 125 MMHG | WEIGHT: 229 LBS | DIASTOLIC BLOOD PRESSURE: 70 MMHG | HEIGHT: 67 IN | HEART RATE: 74 BPM

## 2023-12-21 DIAGNOSIS — B35.6 TINEA CRURIS: ICD-10-CM

## 2023-12-21 DIAGNOSIS — E66.09 OTHER OBESITY DUE TO EXCESS CALORIES: ICD-10-CM

## 2023-12-21 PROCEDURE — 99214 OFFICE O/P EST MOD 30 MIN: CPT

## 2023-12-21 RX ORDER — SILDENAFIL 100 MG/1
100 TABLET, FILM COATED ORAL
Qty: 20 | Refills: 1 | Status: ACTIVE | OUTPATIENT
Start: 2022-04-25

## 2023-12-21 NOTE — LETTER BODY
[Dear  ___] : Dear  [unfilled], [Courtesy Letter:] : I had the pleasure of seeing your patient, [unfilled], in my office today. [Please see my note below.] : Please see my note below. [Sincerely,] : Sincerely, [FreeTextEntry2] : Delphine Garcia MD [address] [place], [place] [id number]

## 2023-12-21 NOTE — PHYSICAL EXAM
[General Appearance - Well Developed] : well developed [General Appearance - Well Nourished] : well nourished [Normal Appearance] : normal appearance [Well Groomed] : well groomed [General Appearance - In No Acute Distress] : no acute distress [Heart Rate And Rhythm] : heart rate and rhythm were normal [Edema] : no peripheral edema [Respiration, Rhythm And Depth] : normal respiratory rhythm and effort [Exaggerated Use Of Accessory Muscles For Inspiration] : no accessory muscle use [Auscultation Breath Sounds / Voice Sounds] : lungs were clear to auscultation bilaterally [Abdomen Soft] : soft [Abdomen Tenderness] : non-tender [Costovertebral Angle Tenderness] : no ~M costovertebral angle tenderness [Normal Station and Gait] : the gait and station were normal for the patient's age [] : no rash [No Focal Deficits] : no focal deficits [Oriented To Time, Place, And Person] : oriented to person, place, and time [Affect] : the affect was normal [Mood] : the mood was normal [Not Anxious] : not anxious [No Palpable Adenopathy] : no palpable adenopathy [de-identified] : Fields of View were normal

## 2023-12-21 NOTE — HISTORY OF PRESENT ILLNESS
Subjective:       Patient ID: Angeles Guerin is a 20 y.o. female.    Chief Complaint: No chief complaint on file.  Angeles is a 18 year old female referred for a second opinion about Diffuse Large B Cell Lymphoma    Initial visit: She was in her usual health until approximately 8 months ago when she started complaining of left knee pain.  Initially thought to be msk in origin and was treated with supportive care.  About two months ago the pain started to worsen which inevitably led to an xray and then MRI of her left femur which showed a mass in her distal femur.  [place] [facility] [place].  Biopsy of the lesion was c/w DLBCL.  She had a CT of her neck, chest, abdomen , and pelvis which did not show any evidence of lymphoma.  Referred here by her oncologist at [facility] for a second opinion         S/p 6 cycles of R-CHOP.  Pt here with dad.   She has done very well since last visit,    No new issues.   Doing well in college  Denies any fevers, weight loss, night sweats.   Menstraul periods normalized on ocps    Follow-up  Pertinent negatives include no chest pain, congestion, coughing, fatigue, fever, headaches, nausea, neck pain, rash, vomiting or weakness.   Review of Systems   Constitutional:  Negative for activity change, appetite change, fatigue and fever.   HENT:  Negative for congestion, hearing loss, mouth sores, nosebleeds, rhinorrhea and sneezing.    Eyes:  Negative for photophobia and visual disturbance.   Respiratory:  Negative for cough, chest tightness, shortness of breath and wheezing.    Cardiovascular:  Negative for chest pain, palpitations and leg swelling.   Gastrointestinal:  Negative for abdominal distention, blood in stool, constipation, diarrhea, nausea and vomiting.   Genitourinary:  Negative for difficulty urinating, hematuria, menstrual problem and pelvic pain.   Musculoskeletal:  Negative for gait problem and neck pain.   Skin:  Negative for pallor and rash.    Neurological:  Negative for dizziness, weakness, light-headedness and headaches.   Hematological:  Negative for adenopathy. Does not bruise/bleed easily.   Psychiatric/Behavioral:  Negative for behavioral problems.      Objective:      Physical Exam  Constitutional:       Appearance: She is well-developed.   HENT:      Head: Normocephalic and atraumatic.      Right Ear: External ear normal.      Left Ear: External ear normal.      Nose: Nose normal.   Eyes:      Pupils: Pupils are equal, round, and reactive to light.   Cardiovascular:      Rate and Rhythm: Normal rate and regular rhythm.      Heart sounds: Normal heart sounds. No murmur heard.    No friction rub. No gallop.   Pulmonary:      Effort: No respiratory distress.      Breath sounds: Normal breath sounds. No wheezing or rales.   Chest:      Chest wall: No tenderness.   Abdominal:      General: Bowel sounds are normal. There is no distension.      Palpations: Abdomen is soft. There is no mass.      Tenderness: There is no abdominal tenderness. There is no guarding or rebound.   Musculoskeletal:         General: No tenderness. Normal range of motion.      Cervical back: Normal range of motion and neck supple.   Lymphadenopathy:      Cervical: No cervical adenopathy.   Skin:     General: Skin is warm and dry.      Coloration: Skin is not pale.      Findings: No erythema or rash.   Neurological:      Mental Status: She is alert and oriented to person, place, and time.      Cranial Nerves: No cranial nerve deficit.       Assessment:       No diagnosis found.    Plan:       17yo female with DLBCL of the bone  PErsonally reviewed CT neck c/a/p. Bone scan, PET scan.  No evidence of disease outside the femur lesion  CSF negative  Bone marrow negative  Our pathologist reviewed path and agrees that this is DLBCL although cannot subtype it nor can they do genetic studies given decalcified specimend  Finished therapy 9/2017  Total Cytoxan dose: 4500 mg/m2  Total  [Currently Experiencing ___] :  [unfilled] Anthracycline dose:  300 mg/m2       Restaging MRI  Negative   Counts good   No symptoms  ESR nl            RTC in 1 yr  PE and labs                 I spent 30min with family >50% in counseling                         [Erectile Dysfunction] : Erectile Dysfunction [Fatigue] : fatigue [None] : None [FreeTextEntry1] : Chacho is a 62-year-old male born November 18, 1960 last seen  08/28/2023 We follow him Dermatophytosis for which he uses econazole he says it is better in the winter, but he has the drug if he needs it. He has low testosterone being treated with 2 pumps per day of AndroGel he had bloods done on August 19 and is here for review and refill  Finally, when he has testosterone on board he is using sildenafil at the 100 mg dose is not working and he wants to know options. He tells me there has been no changes in his medical conditions and in general he feels good  Blood test were done November 25, 2023 with him at 2 pumps per day Liver function tests are normal Hemoglobin was 14.6 with a platelet count of 397,000 Total testosterone was 625 Free testosterone was 187 Bioavailable testosterone was 376 Sex hormone binding globulin was 11 with albumin of 4.4 LH was undetectable Estradiol was 47 PSA was 1.4/21%  [Urinary Incontinence] : no urinary incontinence [Urinary Retention] : no urinary retention [Urinary Urgency] : no urinary urgency [Urinary Frequency] : no urinary frequency [Nocturia] : no nocturia [Straining] : no straining [Weak Stream] : no weak stream [Intermittency] : no intermittency [Post-Void Dribbling] : no post-void dribbling [Dysuria] : no dysuria [Hematuria - Gross] : no gross hematuria

## 2023-12-21 NOTE — LETTER HEADER
[FreeTextEntry3] : Svitlana Sal MD [address] [place] [address], [address] [place] [phone]

## 2023-12-21 NOTE — ASSESSMENT
[FreeTextEntry1] : His numbers are good except for the estradiol which is not that bad we will let it ride.  His exam is benign and we will renew the medication and see him in 3 months

## 2024-02-20 ENCOUNTER — NON-APPOINTMENT (OUTPATIENT)
Age: 64
End: 2024-02-20

## 2024-02-25 ENCOUNTER — NON-APPOINTMENT (OUTPATIENT)
Age: 64
End: 2024-02-25

## 2024-04-11 ENCOUNTER — APPOINTMENT (OUTPATIENT)
Dept: UROLOGY | Facility: CLINIC | Age: 64
End: 2024-04-11
Payer: COMMERCIAL

## 2024-04-11 VITALS
HEART RATE: 74 BPM | OXYGEN SATURATION: 98 % | BODY MASS INDEX: 35.94 KG/M2 | WEIGHT: 229 LBS | DIASTOLIC BLOOD PRESSURE: 78 MMHG | HEIGHT: 67 IN | SYSTOLIC BLOOD PRESSURE: 143 MMHG | TEMPERATURE: 97.1 F

## 2024-04-11 DIAGNOSIS — R79.89 OTHER SPECIFIED ABNORMAL FINDINGS OF BLOOD CHEMISTRY: ICD-10-CM

## 2024-04-11 PROCEDURE — G2211 COMPLEX E/M VISIT ADD ON: CPT

## 2024-04-11 PROCEDURE — 99214 OFFICE O/P EST MOD 30 MIN: CPT

## 2024-04-11 RX ORDER — ECONAZOLE NITRATE 10 MG/G
1 CREAM TOPICAL TWICE DAILY
Qty: 1 | Refills: 0 | Status: ACTIVE | COMMUNITY
Start: 2021-05-28 | End: 1900-01-01

## 2024-04-11 RX ORDER — TESTOSTERONE 16.2 MG/G
20.25 MG/ACT GEL TRANSDERMAL
Qty: 3 | Refills: 0 | Status: ACTIVE | COMMUNITY
Start: 2022-01-24 | End: 1900-01-01

## 2024-04-11 NOTE — ASSESSMENT
[FreeTextEntry1] : I do not understand that his blood send November had an excellent result and now it is if he is not using the medication.  He tells me he put the medication on went and did some work in the office and then went and got the bloods and he should have had a good level.  We will get him a new prescription perhaps it was just a bad bottle as on the current medication he is not as horny as he was when I saw him a few months ago.  He will get blood done in about 2 weeks and see me after the bloods are done.  If it is still low we will have to consider other options

## 2024-04-11 NOTE — HISTORY OF PRESENT ILLNESS
[FreeTextEntry1] : Chacho is a 63-year-old male born November 18, 1960 last seen  12/21/2023 His Dermatophytosis has recurred and he needs more econazole.  He has low testosterone being treated with 2 pumps per day of AndroGel he had bloods done on 04/01/2024 and is here for review and refill  Finally, even when he has testosterone on board and he is using sildenafil at the 100 mg dose is working but they only try infrequently. He tells me there has been no changes in his medical conditions and in general he feels good  Blood test were done 04/01/2024 on 2 pumps per day. Total testosterone was 124 Free testosterone was 28 Bioavailable testosterone was 57 Liver function tests were normal Hemoglobin was 14.8 Estradiol was 18 [Currently Experiencing ___] :  [unfilled] [Urinary Incontinence] : no urinary incontinence [Urinary Retention] : no urinary retention [Urinary Urgency] : no urinary urgency [Urinary Frequency] : no urinary frequency [Nocturia] : no nocturia [Straining] : no straining [Weak Stream] : no weak stream [Intermittency] : no intermittency [Post-Void Dribbling] : no post-void dribbling [Erectile Dysfunction] : Erectile Dysfunction [Dysuria] : no dysuria [Hematuria - Gross] : no gross hematuria [Fatigue] : fatigue [None] : None

## 2024-04-11 NOTE — LETTER BODY
[Dear  ___] : Dear  [unfilled], [Courtesy Letter:] : I had the pleasure of seeing your patient, [unfilled], in my office today. [Please see my note below.] : Please see my note below. [Sincerely,] : Sincerely, [FreeTextEntry2] : Delphine Garcia MD [address] [place], [place] [phone]

## 2024-04-11 NOTE — PHYSICAL EXAM
[General Appearance - Well Developed] : well developed [General Appearance - Well Nourished] : well nourished [Normal Appearance] : normal appearance [Well Groomed] : well groomed [General Appearance - In No Acute Distress] : no acute distress [Heart Rate And Rhythm] : heart rate and rhythm were normal [Edema] : no peripheral edema [Respiration, Rhythm And Depth] : normal respiratory rhythm and effort [Exaggerated Use Of Accessory Muscles For Inspiration] : no accessory muscle use [Auscultation Breath Sounds / Voice Sounds] : lungs were clear to auscultation bilaterally [Abdomen Soft] : soft [Abdomen Tenderness] : non-tender [Costovertebral Angle Tenderness] : no ~M costovertebral angle tenderness [Normal Station and Gait] : the gait and station were normal for the patient's age [] : no rash [No Focal Deficits] : no focal deficits [Oriented To Time, Place, And Person] : oriented to person, place, and time [Affect] : the affect was normal [Mood] : the mood was normal [Not Anxious] : not anxious [No Palpable Adenopathy] : no palpable adenopathy [de-identified] : Fields of View were normal

## 2024-04-11 NOTE — LETTER HEADER
[FreeTextEntry3] : Svitlana Sal MD [address] [place] [address], [address] [place] [phone]

## 2024-06-07 ENCOUNTER — NON-APPOINTMENT (OUTPATIENT)
Age: 64
End: 2024-06-07

## 2024-06-11 ENCOUNTER — INPATIENT (INPATIENT)
Facility: HOSPITAL | Age: 64
LOS: 2 days | Discharge: HOME | DRG: 864 | End: 2024-06-14
Attending: HOSPITALIST | Admitting: STUDENT IN AN ORGANIZED HEALTH CARE EDUCATION/TRAINING PROGRAM
Payer: COMMERCIAL

## 2024-06-11 VITALS
DIASTOLIC BLOOD PRESSURE: 69 MMHG | WEIGHT: 229.94 LBS | TEMPERATURE: 99 F | OXYGEN SATURATION: 96 % | HEART RATE: 103 BPM | SYSTOLIC BLOOD PRESSURE: 175 MMHG | RESPIRATION RATE: 18 BRPM | HEIGHT: 67 IN

## 2024-06-11 LAB
ALBUMIN SERPL ELPH-MCNC: 4 G/DL — SIGNIFICANT CHANGE UP (ref 3.5–5.2)
ALP SERPL-CCNC: 152 U/L — HIGH (ref 30–115)
ALT FLD-CCNC: 61 U/L — HIGH (ref 0–41)
ANION GAP SERPL CALC-SCNC: 13 MMOL/L — SIGNIFICANT CHANGE UP (ref 7–14)
APPEARANCE UR: CLEAR — SIGNIFICANT CHANGE UP
AST SERPL-CCNC: 53 U/L — HIGH (ref 0–41)
BACTERIA # UR AUTO: NEGATIVE /HPF — SIGNIFICANT CHANGE UP
BASE EXCESS BLDV CALC-SCNC: 7.6 MMOL/L — HIGH (ref -2–3)
BASOPHILS # BLD AUTO: 0.05 K/UL — SIGNIFICANT CHANGE UP (ref 0–0.2)
BASOPHILS NFR BLD AUTO: 0.5 % — SIGNIFICANT CHANGE UP (ref 0–1)
BILIRUB SERPL-MCNC: 0.5 MG/DL — SIGNIFICANT CHANGE UP (ref 0.2–1.2)
BILIRUB UR-MCNC: NEGATIVE — SIGNIFICANT CHANGE UP
BUN SERPL-MCNC: 14 MG/DL — SIGNIFICANT CHANGE UP (ref 10–20)
CALCIUM SERPL-MCNC: 8.9 MG/DL — SIGNIFICANT CHANGE UP (ref 8.4–10.4)
CAST: 0 /LPF — SIGNIFICANT CHANGE UP (ref 0–4)
CHLORIDE SERPL-SCNC: 97 MMOL/L — LOW (ref 98–110)
CO2 SERPL-SCNC: 27 MMOL/L — SIGNIFICANT CHANGE UP (ref 17–32)
COLOR SPEC: YELLOW — SIGNIFICANT CHANGE UP
CREAT SERPL-MCNC: 1.1 MG/DL — SIGNIFICANT CHANGE UP (ref 0.7–1.5)
DIFF PNL FLD: ABNORMAL
EGFR: 75 ML/MIN/1.73M2 — SIGNIFICANT CHANGE UP
EOSINOPHIL # BLD AUTO: 0.02 K/UL — SIGNIFICANT CHANGE UP (ref 0–0.7)
EOSINOPHIL NFR BLD AUTO: 0.2 % — SIGNIFICANT CHANGE UP (ref 0–8)
GAS PNL BLDV: 132 MMOL/L — LOW (ref 136–145)
GAS PNL BLDV: SIGNIFICANT CHANGE UP
GAS PNL BLDV: SIGNIFICANT CHANGE UP
GLUCOSE SERPL-MCNC: 100 MG/DL — HIGH (ref 70–99)
GLUCOSE UR QL: NEGATIVE MG/DL — SIGNIFICANT CHANGE UP
HCO3 BLDV-SCNC: 32 MMOL/L — HIGH (ref 22–29)
HCT VFR BLD CALC: 40 % — LOW (ref 42–52)
HGB BLD-MCNC: 13.4 G/DL — LOW (ref 14–18)
IMM GRANULOCYTES NFR BLD AUTO: 0.4 % — HIGH (ref 0.1–0.3)
KETONES UR-MCNC: NEGATIVE MG/DL — SIGNIFICANT CHANGE UP
LACTATE BLDV-MCNC: 1.2 MMOL/L — SIGNIFICANT CHANGE UP (ref 0.5–2)
LEUKOCYTE ESTERASE UR-ACNC: NEGATIVE — SIGNIFICANT CHANGE UP
LYMPHOCYTES # BLD AUTO: 1.29 K/UL — SIGNIFICANT CHANGE UP (ref 1.2–3.4)
LYMPHOCYTES # BLD AUTO: 13.4 % — LOW (ref 20.5–51.1)
MCHC RBC-ENTMCNC: 30.2 PG — SIGNIFICANT CHANGE UP (ref 27–31)
MCHC RBC-ENTMCNC: 33.5 G/DL — SIGNIFICANT CHANGE UP (ref 32–37)
MCV RBC AUTO: 90.3 FL — SIGNIFICANT CHANGE UP (ref 80–94)
MONOCYTES # BLD AUTO: 1.03 K/UL — HIGH (ref 0.1–0.6)
MONOCYTES NFR BLD AUTO: 10.7 % — HIGH (ref 1.7–9.3)
NEUTROPHILS # BLD AUTO: 7.17 K/UL — HIGH (ref 1.4–6.5)
NEUTROPHILS NFR BLD AUTO: 74.8 % — SIGNIFICANT CHANGE UP (ref 42.2–75.2)
NITRITE UR-MCNC: NEGATIVE — SIGNIFICANT CHANGE UP
NRBC # BLD: 0 /100 WBCS — SIGNIFICANT CHANGE UP (ref 0–0)
PCO2 BLDV: 43 MMHG — SIGNIFICANT CHANGE UP (ref 42–55)
PH BLDV: 7.48 — HIGH (ref 7.32–7.43)
PH UR: 7 — SIGNIFICANT CHANGE UP (ref 5–8)
PLATELET # BLD AUTO: 274 K/UL — SIGNIFICANT CHANGE UP (ref 130–400)
PMV BLD: 9.9 FL — SIGNIFICANT CHANGE UP (ref 7.4–10.4)
PO2 BLDV: 18 MMHG — LOW (ref 25–45)
POTASSIUM SERPL-MCNC: 4.6 MMOL/L — SIGNIFICANT CHANGE UP (ref 3.5–5)
POTASSIUM SERPL-SCNC: 4.6 MMOL/L — SIGNIFICANT CHANGE UP (ref 3.5–5)
PROT SERPL-MCNC: 6.7 G/DL — SIGNIFICANT CHANGE UP (ref 6–8)
PROT UR-MCNC: SIGNIFICANT CHANGE UP MG/DL
RBC # BLD: 4.43 M/UL — LOW (ref 4.7–6.1)
RBC # FLD: 13.1 % — SIGNIFICANT CHANGE UP (ref 11.5–14.5)
RBC CASTS # UR COMP ASSIST: 3 /HPF — SIGNIFICANT CHANGE UP (ref 0–4)
SAO2 % BLDV: 23.4 % — LOW (ref 67–88)
SODIUM SERPL-SCNC: 137 MMOL/L — SIGNIFICANT CHANGE UP (ref 135–146)
SP GR SPEC: 1.01 — SIGNIFICANT CHANGE UP (ref 1–1.03)
SQUAMOUS # UR AUTO: 0 /HPF — SIGNIFICANT CHANGE UP (ref 0–5)
TROPONIN T, HIGH SENSITIVITY RESULT: 22 NG/L — HIGH (ref 6–21)
UROBILINOGEN FLD QL: 1 MG/DL — SIGNIFICANT CHANGE UP (ref 0.2–1)
WBC # BLD: 9.6 K/UL — SIGNIFICANT CHANGE UP (ref 4.8–10.8)
WBC # FLD AUTO: 9.6 K/UL — SIGNIFICANT CHANGE UP (ref 4.8–10.8)
WBC UR QL: 0 /HPF — SIGNIFICANT CHANGE UP (ref 0–5)

## 2024-06-11 PROCEDURE — 99285 EMERGENCY DEPT VISIT HI MDM: CPT

## 2024-06-11 PROCEDURE — 71046 X-RAY EXAM CHEST 2 VIEWS: CPT | Mod: 26

## 2024-06-11 PROCEDURE — 93010 ELECTROCARDIOGRAM REPORT: CPT

## 2024-06-11 PROCEDURE — 71275 CT ANGIOGRAPHY CHEST: CPT | Mod: 26,MC

## 2024-06-11 RX ORDER — SODIUM CHLORIDE 9 MG/ML
2000 INJECTION, SOLUTION INTRAVENOUS ONCE
Refills: 0 | Status: COMPLETED | OUTPATIENT
Start: 2024-06-11 | End: 2024-06-11

## 2024-06-11 RX ORDER — ACETAMINOPHEN 500 MG
975 TABLET ORAL ONCE
Refills: 0 | Status: COMPLETED | OUTPATIENT
Start: 2024-06-11 | End: 2024-06-11

## 2024-06-11 RX ADMIN — SODIUM CHLORIDE 2000 MILLILITER(S): 9 INJECTION, SOLUTION INTRAVENOUS at 22:35

## 2024-06-11 RX ADMIN — Medication 975 MILLIGRAM(S): at 22:34

## 2024-06-11 NOTE — ED ADULT TRIAGE NOTE - CHIEF COMPLAINT QUOTE
pt c/o fever and chest pain x5 days, unresolved with OTC meds [Fatigue] : fatigue [Recent Change In Weight] : ~T recent weight change [Vision Problems] : vision problems [Joint Pain] : joint pain [Joint Stiffness] : joint stiffness [Muscle Pain] : muscle pain [Back Pain] : back pain [Skin Rash] : skin rash [Suicidal] : not suicidal [Insomnia] : insomnia [Anxiety] : anxiety [Negative] : Neurological

## 2024-06-11 NOTE — ED PROVIDER NOTE - PHYSICAL EXAMINATION
CONSTITUTIONAL: non-toxic appearing male, no acute distress  SKIN: skin exam is warm and dry  HEAD: Normocephalic; atraumatic  EYES: PERRL, EOM intact; conjunctiva and sclera clear.  ENT: MMM   CARD: S1, S2 normal, no murmur  RESP: No wheezes, rales or rhonchi. Good air movement bilaterally  ABD: soft; non-distended; non-tender.    EXT: Normal ROM.    NEURO: awake, alert, following commands, oriented, grossly unremarkable. No Focal deficits. GCS 15.   PSYCH: Cooperative, appropriate.

## 2024-06-11 NOTE — ED PROVIDER NOTE - OBJECTIVE STATEMENT
63 year old male, past medical history htn, hdl, dm, hypothyroidism, who presents with chest pain. patient with fever, tmax 102, that began x5 days ago with left sided chest pain that began x4 days ago. patient reports intermittent episodes of left sided chest pain, non-radiating, worse with exertion. reports associated generalized fatigue. denies vision changes, cough, uri symptoms, abd pain, vomiting/diarrhea, urinary symptoms, rash. no recent travel or recent sick contacts.

## 2024-06-11 NOTE — ED PROVIDER NOTE - CLINICAL SUMMARY MEDICAL DECISION MAKING FREE TEXT BOX
64 yo M, hx of HTN, HLD, DM I, hypothyroidism here for assessment of fever x 5 days, daily temp 102, chest pain x 4 days. Pain is L sided, non radiating, worse with exertion. No cough, dyspnea, nausea, vomiting, dysuria, hematuria, rash. Notes generalized fatigue, myalgias.   VS notable for tachycardia, afebrile orally but had tactile fever and repeat temp 101.8. Exam unremarkable -- patient is well appearing, clear lungs, RRR, soft, NT, DN abdomen, no rash, normal TMs.    Sepsis work up initated, includinG CTA to ro PE -- patient without any clear source of infection -- given age, comorbidities, specifically DM II, duration and degree of fever will give empiric abx and admit for sepsis, unknown source.     Patient is currently hemodynamically stable without indication for SDU or ICU eval.

## 2024-06-11 NOTE — ED ADULT NURSE NOTE - NSICDXPASTMEDICALHX_GEN_ALL_CORE_FT
PAST MEDICAL HISTORY:  Hepatic steatosis     HLD (hyperlipidemia)     HTN (hypertension)     Hypothyroidism

## 2024-06-12 DIAGNOSIS — R50.9 FEVER, UNSPECIFIED: ICD-10-CM

## 2024-06-12 LAB
ALBUMIN SERPL ELPH-MCNC: 3.6 G/DL — SIGNIFICANT CHANGE UP (ref 3.5–5.2)
ALP SERPL-CCNC: 131 U/L — HIGH (ref 30–115)
ALT FLD-CCNC: 59 U/L — HIGH (ref 0–41)
ANION GAP SERPL CALC-SCNC: 12 MMOL/L — SIGNIFICANT CHANGE UP (ref 7–14)
AST SERPL-CCNC: 41 U/L — SIGNIFICANT CHANGE UP (ref 0–41)
BASOPHILS # BLD AUTO: 0.07 K/UL — SIGNIFICANT CHANGE UP (ref 0–0.2)
BASOPHILS NFR BLD AUTO: 0.7 % — SIGNIFICANT CHANGE UP (ref 0–1)
BILIRUB SERPL-MCNC: 0.4 MG/DL — SIGNIFICANT CHANGE UP (ref 0.2–1.2)
BUN SERPL-MCNC: 11 MG/DL — SIGNIFICANT CHANGE UP (ref 10–20)
CALCIUM SERPL-MCNC: 8.5 MG/DL — SIGNIFICANT CHANGE UP (ref 8.4–10.4)
CHLORIDE SERPL-SCNC: 101 MMOL/L — SIGNIFICANT CHANGE UP (ref 98–110)
CO2 SERPL-SCNC: 26 MMOL/L — SIGNIFICANT CHANGE UP (ref 17–32)
CREAT SERPL-MCNC: 1 MG/DL — SIGNIFICANT CHANGE UP (ref 0.7–1.5)
CRP SERPL-MCNC: 107.2 MG/L — HIGH
EGFR: 84 ML/MIN/1.73M2 — SIGNIFICANT CHANGE UP
EOSINOPHIL # BLD AUTO: 0.06 K/UL — SIGNIFICANT CHANGE UP (ref 0–0.7)
EOSINOPHIL NFR BLD AUTO: 0.6 % — SIGNIFICANT CHANGE UP (ref 0–8)
ERYTHROCYTE [SEDIMENTATION RATE] IN BLOOD: 78 MM/HR — HIGH (ref 0–10)
FLUAV AG NPH QL: SIGNIFICANT CHANGE UP
FLUBV AG NPH QL: SIGNIFICANT CHANGE UP
GLUCOSE BLDC GLUCOMTR-MCNC: 124 MG/DL — HIGH (ref 70–99)
GLUCOSE BLDC GLUCOMTR-MCNC: 127 MG/DL — HIGH (ref 70–99)
GLUCOSE BLDC GLUCOMTR-MCNC: 128 MG/DL — HIGH (ref 70–99)
GLUCOSE BLDC GLUCOMTR-MCNC: 161 MG/DL — HIGH (ref 70–99)
GLUCOSE SERPL-MCNC: 120 MG/DL — HIGH (ref 70–99)
HCT VFR BLD CALC: 36.3 % — LOW (ref 42–52)
HGB BLD-MCNC: 12 G/DL — LOW (ref 14–18)
IMM GRANULOCYTES NFR BLD AUTO: 0.3 % — SIGNIFICANT CHANGE UP (ref 0.1–0.3)
LYMPHOCYTES # BLD AUTO: 1.19 K/UL — LOW (ref 1.2–3.4)
LYMPHOCYTES # BLD AUTO: 12.4 % — LOW (ref 20.5–51.1)
MAGNESIUM SERPL-MCNC: 2.3 MG/DL — SIGNIFICANT CHANGE UP (ref 1.8–2.4)
MCHC RBC-ENTMCNC: 30.2 PG — SIGNIFICANT CHANGE UP (ref 27–31)
MCHC RBC-ENTMCNC: 33.1 G/DL — SIGNIFICANT CHANGE UP (ref 32–37)
MCV RBC AUTO: 91.4 FL — SIGNIFICANT CHANGE UP (ref 80–94)
MONOCYTES # BLD AUTO: 1 K/UL — HIGH (ref 0.1–0.6)
MONOCYTES NFR BLD AUTO: 10.4 % — HIGH (ref 1.7–9.3)
NEUTROPHILS # BLD AUTO: 7.23 K/UL — HIGH (ref 1.4–6.5)
NEUTROPHILS NFR BLD AUTO: 75.6 % — HIGH (ref 42.2–75.2)
NRBC # BLD: 0 /100 WBCS — SIGNIFICANT CHANGE UP (ref 0–0)
PLATELET # BLD AUTO: 262 K/UL — SIGNIFICANT CHANGE UP (ref 130–400)
PMV BLD: 10.2 FL — SIGNIFICANT CHANGE UP (ref 7.4–10.4)
POTASSIUM SERPL-MCNC: 4.5 MMOL/L — SIGNIFICANT CHANGE UP (ref 3.5–5)
POTASSIUM SERPL-SCNC: 4.5 MMOL/L — SIGNIFICANT CHANGE UP (ref 3.5–5)
PROCALCITONIN SERPL-MCNC: 0.4 NG/ML — HIGH (ref 0.02–0.1)
PROT SERPL-MCNC: 6 G/DL — SIGNIFICANT CHANGE UP (ref 6–8)
RBC # BLD: 3.97 M/UL — LOW (ref 4.7–6.1)
RBC # FLD: 13.2 % — SIGNIFICANT CHANGE UP (ref 11.5–14.5)
RHEUMATOID FACT SERPL-ACNC: 13 IU/ML — SIGNIFICANT CHANGE UP (ref 0–13)
RSV RNA NPH QL NAA+NON-PROBE: SIGNIFICANT CHANGE UP
SARS-COV-2 RNA SPEC QL NAA+PROBE: SIGNIFICANT CHANGE UP
SODIUM SERPL-SCNC: 139 MMOL/L — SIGNIFICANT CHANGE UP (ref 135–146)
T4 FREE+ TSH PNL SERPL: 0.61 UIU/ML — SIGNIFICANT CHANGE UP (ref 0.27–4.2)
TROPONIN T, HIGH SENSITIVITY RESULT: 19 NG/L — SIGNIFICANT CHANGE UP (ref 6–21)
WBC # BLD: 9.58 K/UL — SIGNIFICANT CHANGE UP (ref 4.8–10.8)
WBC # FLD AUTO: 9.58 K/UL — SIGNIFICANT CHANGE UP (ref 4.8–10.8)

## 2024-06-12 PROCEDURE — 85025 COMPLETE CBC W/AUTO DIFF WBC: CPT

## 2024-06-12 PROCEDURE — 84443 ASSAY THYROID STIM HORMONE: CPT

## 2024-06-12 PROCEDURE — 83735 ASSAY OF MAGNESIUM: CPT

## 2024-06-12 PROCEDURE — 80053 COMPREHEN METABOLIC PANEL: CPT

## 2024-06-12 PROCEDURE — 84145 PROCALCITONIN (PCT): CPT

## 2024-06-12 PROCEDURE — 76705 ECHO EXAM OF ABDOMEN: CPT | Mod: 26

## 2024-06-12 PROCEDURE — 86140 C-REACTIVE PROTEIN: CPT

## 2024-06-12 PROCEDURE — 86431 RHEUMATOID FACTOR QUANT: CPT

## 2024-06-12 PROCEDURE — 85652 RBC SED RATE AUTOMATED: CPT

## 2024-06-12 PROCEDURE — 86618 LYME DISEASE ANTIBODY: CPT

## 2024-06-12 PROCEDURE — 83036 HEMOGLOBIN GLYCOSYLATED A1C: CPT

## 2024-06-12 PROCEDURE — 87798 DETECT AGENT NOS DNA AMP: CPT

## 2024-06-12 PROCEDURE — 80048 BASIC METABOLIC PNL TOTAL CA: CPT

## 2024-06-12 PROCEDURE — 86789 WEST NILE VIRUS ANTIBODY: CPT

## 2024-06-12 PROCEDURE — 99233 SBSQ HOSP IP/OBS HIGH 50: CPT

## 2024-06-12 PROCEDURE — 93005 ELECTROCARDIOGRAM TRACING: CPT

## 2024-06-12 PROCEDURE — 82962 GLUCOSE BLOOD TEST: CPT

## 2024-06-12 PROCEDURE — 86038 ANTINUCLEAR ANTIBODIES: CPT

## 2024-06-12 PROCEDURE — 36415 COLL VENOUS BLD VENIPUNCTURE: CPT

## 2024-06-12 PROCEDURE — 86617 LYME DISEASE ANTIBODY: CPT

## 2024-06-12 PROCEDURE — 93306 TTE W/DOPPLER COMPLETE: CPT

## 2024-06-12 PROCEDURE — 85610 PROTHROMBIN TIME: CPT

## 2024-06-12 PROCEDURE — 86788 WEST NILE VIRUS AB IGM: CPT

## 2024-06-12 RX ORDER — VANCOMYCIN HCL 1 G
1500 VIAL (EA) INTRAVENOUS EVERY 12 HOURS
Refills: 0 | Status: DISCONTINUED | OUTPATIENT
Start: 2024-06-12 | End: 2024-06-12

## 2024-06-12 RX ORDER — LEVOTHYROXINE SODIUM 125 MCG
150 TABLET ORAL DAILY
Refills: 0 | Status: DISCONTINUED | OUTPATIENT
Start: 2024-06-12 | End: 2024-06-14

## 2024-06-12 RX ORDER — ENOXAPARIN SODIUM 100 MG/ML
40 INJECTION SUBCUTANEOUS EVERY 24 HOURS
Refills: 0 | Status: DISCONTINUED | OUTPATIENT
Start: 2024-06-12 | End: 2024-06-14

## 2024-06-12 RX ORDER — INSULIN LISPRO 100/ML
VIAL (ML) SUBCUTANEOUS AT BEDTIME
Refills: 0 | Status: DISCONTINUED | OUTPATIENT
Start: 2024-06-12 | End: 2024-06-14

## 2024-06-12 RX ORDER — CEFEPIME 1 G/1
2000 INJECTION, POWDER, FOR SOLUTION INTRAMUSCULAR; INTRAVENOUS EVERY 12 HOURS
Refills: 0 | Status: DISCONTINUED | OUTPATIENT
Start: 2024-06-12 | End: 2024-06-13

## 2024-06-12 RX ORDER — SODIUM CHLORIDE 9 MG/ML
1000 INJECTION INTRAMUSCULAR; INTRAVENOUS; SUBCUTANEOUS
Refills: 0 | Status: COMPLETED | OUTPATIENT
Start: 2024-06-12 | End: 2024-06-12

## 2024-06-12 RX ORDER — ACETAMINOPHEN 500 MG
975 TABLET ORAL ONCE
Refills: 0 | Status: COMPLETED | OUTPATIENT
Start: 2024-06-12 | End: 2024-06-12

## 2024-06-12 RX ORDER — SIMVASTATIN 20 MG/1
1 TABLET, FILM COATED ORAL
Refills: 0 | DISCHARGE

## 2024-06-12 RX ORDER — DEXTROSE 50 % IN WATER 50 %
15 SYRINGE (ML) INTRAVENOUS ONCE
Refills: 0 | Status: DISCONTINUED | OUTPATIENT
Start: 2024-06-12 | End: 2024-06-14

## 2024-06-12 RX ORDER — METFORMIN HYDROCHLORIDE 850 MG/1
1 TABLET ORAL
Refills: 0 | DISCHARGE

## 2024-06-12 RX ORDER — GLUCAGON INJECTION, SOLUTION 0.5 MG/.1ML
1 INJECTION, SOLUTION SUBCUTANEOUS ONCE
Refills: 0 | Status: DISCONTINUED | OUTPATIENT
Start: 2024-06-12 | End: 2024-06-14

## 2024-06-12 RX ORDER — VANCOMYCIN HCL 1 G
1000 VIAL (EA) INTRAVENOUS ONCE
Refills: 0 | Status: COMPLETED | OUTPATIENT
Start: 2024-06-12 | End: 2024-06-12

## 2024-06-12 RX ORDER — ACETAMINOPHEN 500 MG
975 TABLET ORAL EVERY 8 HOURS
Refills: 0 | Status: DISCONTINUED | OUTPATIENT
Start: 2024-06-12 | End: 2024-06-14

## 2024-06-12 RX ORDER — DEXTROSE 50 % IN WATER 50 %
25 SYRINGE (ML) INTRAVENOUS ONCE
Refills: 0 | Status: DISCONTINUED | OUTPATIENT
Start: 2024-06-12 | End: 2024-06-14

## 2024-06-12 RX ORDER — VANCOMYCIN HCL 1 G
1250 VIAL (EA) INTRAVENOUS EVERY 12 HOURS
Refills: 0 | Status: DISCONTINUED | OUTPATIENT
Start: 2024-06-12 | End: 2024-06-13

## 2024-06-12 RX ORDER — DEXTROSE 50 % IN WATER 50 %
12.5 SYRINGE (ML) INTRAVENOUS ONCE
Refills: 0 | Status: DISCONTINUED | OUTPATIENT
Start: 2024-06-12 | End: 2024-06-14

## 2024-06-12 RX ORDER — INSULIN LISPRO 100/ML
VIAL (ML) SUBCUTANEOUS
Refills: 0 | Status: DISCONTINUED | OUTPATIENT
Start: 2024-06-12 | End: 2024-06-14

## 2024-06-12 RX ORDER — LEVOTHYROXINE SODIUM 125 MCG
1 TABLET ORAL
Refills: 0 | DISCHARGE

## 2024-06-12 RX ORDER — SODIUM CHLORIDE 9 MG/ML
1000 INJECTION, SOLUTION INTRAVENOUS
Refills: 0 | Status: DISCONTINUED | OUTPATIENT
Start: 2024-06-12 | End: 2024-06-14

## 2024-06-12 RX ORDER — LISINOPRIL 2.5 MG/1
40 TABLET ORAL DAILY
Refills: 0 | Status: DISCONTINUED | OUTPATIENT
Start: 2024-06-12 | End: 2024-06-14

## 2024-06-12 RX ORDER — CEFEPIME 1 G/1
2000 INJECTION, POWDER, FOR SOLUTION INTRAMUSCULAR; INTRAVENOUS ONCE
Refills: 0 | Status: COMPLETED | OUTPATIENT
Start: 2024-06-12 | End: 2024-06-12

## 2024-06-12 RX ORDER — IBUPROFEN 200 MG
600 TABLET ORAL EVERY 8 HOURS
Refills: 0 | Status: DISCONTINUED | OUTPATIENT
Start: 2024-06-12 | End: 2024-06-14

## 2024-06-12 RX ORDER — LANOLIN ALCOHOL/MO/W.PET/CERES
3 CREAM (GRAM) TOPICAL AT BEDTIME
Refills: 0 | Status: DISCONTINUED | OUTPATIENT
Start: 2024-06-12 | End: 2024-06-14

## 2024-06-12 RX ORDER — SIMVASTATIN 20 MG/1
20 TABLET, FILM COATED ORAL AT BEDTIME
Refills: 0 | Status: DISCONTINUED | OUTPATIENT
Start: 2024-06-12 | End: 2024-06-14

## 2024-06-12 RX ORDER — DEXTROSE 10 % IN WATER 10 %
125 INTRAVENOUS SOLUTION INTRAVENOUS ONCE
Refills: 0 | Status: DISCONTINUED | OUTPATIENT
Start: 2024-06-12 | End: 2024-06-14

## 2024-06-12 RX ORDER — LISINOPRIL 2.5 MG/1
1 TABLET ORAL
Refills: 0 | DISCHARGE

## 2024-06-12 RX ADMIN — Medication 150 MICROGRAM(S): at 14:48

## 2024-06-12 RX ADMIN — SODIUM CHLORIDE 2000 MILLILITER(S): 9 INJECTION, SOLUTION INTRAVENOUS at 00:00

## 2024-06-12 RX ADMIN — Medication 975 MILLIGRAM(S): at 06:47

## 2024-06-12 RX ADMIN — SIMVASTATIN 20 MILLIGRAM(S): 20 TABLET, FILM COATED ORAL at 22:44

## 2024-06-12 RX ADMIN — Medication 166.67 MILLIGRAM(S): at 18:57

## 2024-06-12 RX ADMIN — CEFEPIME 100 MILLIGRAM(S): 1 INJECTION, POWDER, FOR SOLUTION INTRAMUSCULAR; INTRAVENOUS at 17:27

## 2024-06-12 RX ADMIN — Medication 975 MILLIGRAM(S): at 11:00

## 2024-06-12 RX ADMIN — Medication 250 MILLIGRAM(S): at 02:45

## 2024-06-12 RX ADMIN — CEFEPIME 100 MILLIGRAM(S): 1 INJECTION, POWDER, FOR SOLUTION INTRAMUSCULAR; INTRAVENOUS at 02:30

## 2024-06-12 RX ADMIN — SODIUM CHLORIDE 75 MILLILITER(S): 9 INJECTION INTRAMUSCULAR; INTRAVENOUS; SUBCUTANEOUS at 22:46

## 2024-06-12 RX ADMIN — Medication 975 MILLIGRAM(S): at 10:26

## 2024-06-12 NOTE — H&P ADULT - ATTENDING COMMENTS
Patient seen at bedside. Changes made within note as well. Discussed with medical team that includes resident(s), medical student(s), nursing staff, case management.     as per initial summary     64 y/o man w PMHx of HTN, HLD, DM2, hypothyroidism, COVID 2/2024, had onset of fever and cough 5 days prior to admission, went to urgent care 6/8/24 and had flu/COVID negative, presented to ED for continued fevers and found to have fever 102.9, CTA PE protocol negative for PE, no new consolidations, given Vanc Cefepime, ECG notable for PVCs, admitted to telemetry for further evaluation.   HCP wife Rufina Suarez [phone]    #Fever  #Cough   SIRS(+) for fever, tachycardia. No tachypnea, leukocytosis.   ID consult,   h/o some night sweats. no lymphoadenopathy  follow ID recommendations   no chest pain or discomfort during my interview     #HTN - continue Lisinopril   #HLD - continue home statin   #DM2 - metformin, 1+ SS while in hospital   #hypothyroidism - cont home meds, f/u TSH                                                                               ----------------------------------------------------  # DVT prophylaxis: Lovenox 40mg   # GI prophylaxis: n/a   # Diet: Regular DASH TLC CC   # Activity: Ambulate as tolerated  # Code status: FULL CODE   # Disposition: Telemetry                                                                           --------------------------------------------------------    follow up: monitor fever spikes continue iv abx. follow pending labs.   family at bedside. discussed at length.

## 2024-06-12 NOTE — H&P ADULT - ASSESSMENT
62 y/o man w PMHx of HTN, HLD, DM2, hypothyroidism, COVID 2/2024, had onset of fever and cough 5 days prior to admission, went to urgent care 6/8/24 and had flu/COVID negative, presented to ED for continued fevers and found to have fever 102.9, CTA PE protocol negative for PE, no new consolidations, given Vanc Cefepime, ECG notable for PVCs, admitted to telemetry for further evaluation.   HCP wife Rufina Suarez [phone]    #Fever  #Cough   #CP   SIRS(+) for fever, tachycardia. No tachypnea, leukocytosis.   DDx includes viral syndrome, less likely pericarditis (no positional component, no significant EKG changes, no effusion on CT). No consolidations or cavitations noted on CT. No PE. ACS unlikely; trops 22 and 19, no significant ECG changes.   - Received Vanc Cefepime; no localized bacterial source at this time - monitor off abx   - BCx pending   - f/u ESR CRP procal   - pain control w tylenol  - TTE ordered to eval for effusion, but no effusion on CTA     #HTN - continue Lisinopril   #HLD - continue home statin   #DM2 - metformin, 1+ SS while in hospital   #hypothyroidism - cont home meds, f/u TSH                                                                               ----------------------------------------------------  # DVT prophylaxis: Lovenox 40mg   # GI prophylaxis: n/a   # Diet: Regular DASH TLC CC   # Activity: Ambulate as tolerated  # Code status: FULL CODE   # Disposition: Telemetry                                                                           --------------------------------------------------------

## 2024-06-12 NOTE — H&P ADULT - CONVERSATION DETAILS
Explained to pt that unless otherwise stated, in a hospital everyone is treated as full code, meaning if needed in an emergency, compressions are done when a pt's heart does not beat on its own and intubation and ventilation are done when a pt cannot breathe on their own. Asked whether this is something that pt would want if ever required as a life sustaining measure.   Would like be to full code.     Asked whether pt has anyone who they would want to make their healthcare decisions for them if they were too confused, not awake, or otherwise unable to do so.   HCP wife Rufina Suarez [phone]

## 2024-06-12 NOTE — H&P ADULT - NSHPPHYSICALEXAM_GEN_ALL_CORE
GENERAL: NAD, lying in bed comfortably  HEAD:  Atraumatic, Normocephalic  EYES: EOMI, sclera clear  ENT: Moist mucous membranes  NECK: Supple, trachea midline  CHEST/LUNG: Clear to auscultation anteriorly bilaterally; No rales, rhonchi, wheezing, or rubs. Unlabored respirations  HEART: irregular rhythm, sinus on monitor at bedside, not tachycardic on exam. No appreciable murmurs, rubs, or gallops  ABDOMEN: (+)BS; Soft, nontender. softly distended.   EXTREMITIES:  2+ Radial Pulses, brisk capillary refill. No clubbing, cyanosis. Trace edema.   NERVOUS SYSTEM:  A&Ox3, no noted facial droop. Moving all extremities w/o difficulty.   SKIN: No rashes or lesions to b/l forearm, face.

## 2024-06-12 NOTE — H&P ADULT - HISTORY OF PRESENT ILLNESS
64 y/o man w PMHx of HTN, HLD, DM2, hypothyroidism, COVID 2/2024, had onset of fever and cough 5 days prior to admission, went to urgent care 6/8/24 and had flu/COVID negative, presented to ED for continued fevers and found to have fever 102.9, CTA PE protocol negative for PE, no new consolidations, given Vanc Cefepime, ECG notable for PVCs, admitted to telemetry for further evaluation.   HCP wife Rufina Suarez [phone]    At onset, pt noted fever and some diffuse aches/back pain. Also had dry cough, no sensation of chest congestion. CP is described as 6 or 7/10 at worst, localized to L side, not reproducible, not positionally worsened. On ROS reports diffuse headache, brain fog, loss of appetite. Denies rhinorrhea, nasal congestion.   Denies any recent known sick contacts, though mother is in nursing home. Lives w wife at home, no children, no pets. Has small yard, but denies any insect bites. No mold noted at home. No recent travel, changes in lifestyle. No urinary symptoms. No diarrhea. No recent cocaine/drug use. Nonsmoker.    Triage vitals were: 175/69, 103bpm, RR18, 96% on RA, 99.4   Repeat vitals notable for: tmax 102.9   Labs notable for: WBC 9.6, lactate 1.2, trop 22   Imaging: CTA PE protocol negative for PE, no new consolidations. Hepatic steatosis.   Treatments: Vanc Cefepime   Pending: CHRISTOPHER samuels, Janes

## 2024-06-12 NOTE — PATIENT PROFILE ADULT - FALL HARM RISK - HARM RISK INTERVENTIONS

## 2024-06-12 NOTE — ED ADULT NURSE REASSESSMENT NOTE - NS ED NURSE REASSESS COMMENT FT1
Report received from Shi RYAN. Patient reassessed. Patient admitted to tele, remains on cardiac monitor. IVL in place. Denies pain/discomfort. Safety & comfort measures maintained. Plan of care on going.

## 2024-06-12 NOTE — ED ADULT NURSE REASSESSMENT NOTE - NS ED NURSE REASSESS COMMENT FT1
Patient being moved to ED3 bed 13. Report given to Merced Montes RN. Safety & comfort measures maintained.

## 2024-06-13 ENCOUNTER — RESULT REVIEW (OUTPATIENT)
Age: 64
End: 2024-06-13

## 2024-06-13 LAB
A1C WITH ESTIMATED AVERAGE GLUCOSE RESULT: 6.3 % — HIGH (ref 4–5.6)
ANA TITR SER: NEGATIVE — SIGNIFICANT CHANGE UP
ANION GAP SERPL CALC-SCNC: 7 MMOL/L — SIGNIFICANT CHANGE UP (ref 7–14)
B BURGDOR C6 AB SER-ACNC: POSITIVE
B BURGDOR IGG+IGM SER-ACNC: 3.53 INDEX — HIGH (ref 0.01–0.89)
BASOPHILS # BLD AUTO: 0.06 K/UL — SIGNIFICANT CHANGE UP (ref 0–0.2)
BASOPHILS NFR BLD AUTO: 0.6 % — SIGNIFICANT CHANGE UP (ref 0–1)
BUN SERPL-MCNC: 8 MG/DL — LOW (ref 10–20)
CALCIUM SERPL-MCNC: 8.6 MG/DL — SIGNIFICANT CHANGE UP (ref 8.4–10.5)
CHLORIDE SERPL-SCNC: 101 MMOL/L — SIGNIFICANT CHANGE UP (ref 98–110)
CO2 SERPL-SCNC: 31 MMOL/L — SIGNIFICANT CHANGE UP (ref 17–32)
CREAT SERPL-MCNC: 0.8 MG/DL — SIGNIFICANT CHANGE UP (ref 0.7–1.5)
EGFR: 99 ML/MIN/1.73M2 — SIGNIFICANT CHANGE UP
EOSINOPHIL # BLD AUTO: 0.21 K/UL — SIGNIFICANT CHANGE UP (ref 0–0.7)
EOSINOPHIL NFR BLD AUTO: 1.9 % — SIGNIFICANT CHANGE UP (ref 0–8)
ESTIMATED AVERAGE GLUCOSE: 134 MG/DL — HIGH (ref 68–114)
GLUCOSE BLDC GLUCOMTR-MCNC: 104 MG/DL — HIGH (ref 70–99)
GLUCOSE BLDC GLUCOMTR-MCNC: 111 MG/DL — HIGH (ref 70–99)
GLUCOSE BLDC GLUCOMTR-MCNC: 122 MG/DL — HIGH (ref 70–99)
GLUCOSE SERPL-MCNC: 133 MG/DL — HIGH (ref 70–99)
HCT VFR BLD CALC: 37.4 % — LOW (ref 42–52)
HGB BLD-MCNC: 12.4 G/DL — LOW (ref 14–18)
IMM GRANULOCYTES NFR BLD AUTO: 0.8 % — HIGH (ref 0.1–0.3)
LYME IGG AB: 7.05 INDEX — HIGH (ref 0.01–0.9)
LYME IGG INTERP: POSITIVE
LYME IGM AB: 4.48 INDEX — HIGH (ref 0.01–0.9)
LYME IGM INTERP: POSITIVE
LYMPHOCYTES # BLD AUTO: 1.45 K/UL — SIGNIFICANT CHANGE UP (ref 1.2–3.4)
LYMPHOCYTES # BLD AUTO: 13.4 % — LOW (ref 20.5–51.1)
MAGNESIUM SERPL-MCNC: 2.3 MG/DL — SIGNIFICANT CHANGE UP (ref 1.8–2.4)
MCHC RBC-ENTMCNC: 30.5 PG — SIGNIFICANT CHANGE UP (ref 27–31)
MCHC RBC-ENTMCNC: 33.2 G/DL — SIGNIFICANT CHANGE UP (ref 32–37)
MCV RBC AUTO: 91.9 FL — SIGNIFICANT CHANGE UP (ref 80–94)
MONOCYTES # BLD AUTO: 0.54 K/UL — SIGNIFICANT CHANGE UP (ref 0.1–0.6)
MONOCYTES NFR BLD AUTO: 5 % — SIGNIFICANT CHANGE UP (ref 1.7–9.3)
NEUTROPHILS # BLD AUTO: 8.48 K/UL — HIGH (ref 1.4–6.5)
NEUTROPHILS NFR BLD AUTO: 78.3 % — HIGH (ref 42.2–75.2)
NRBC # BLD: 0 /100 WBCS — SIGNIFICANT CHANGE UP (ref 0–0)
PLATELET # BLD AUTO: 305 K/UL — SIGNIFICANT CHANGE UP (ref 130–400)
PMV BLD: 10.2 FL — SIGNIFICANT CHANGE UP (ref 7.4–10.4)
POTASSIUM SERPL-MCNC: 4.2 MMOL/L — SIGNIFICANT CHANGE UP (ref 3.5–5)
POTASSIUM SERPL-SCNC: 4.2 MMOL/L — SIGNIFICANT CHANGE UP (ref 3.5–5)
RBC # BLD: 4.07 M/UL — LOW (ref 4.7–6.1)
RBC # FLD: 13.3 % — SIGNIFICANT CHANGE UP (ref 11.5–14.5)
SODIUM SERPL-SCNC: 139 MMOL/L — SIGNIFICANT CHANGE UP (ref 135–146)
WBC # BLD: 10.83 K/UL — HIGH (ref 4.8–10.8)
WBC # FLD AUTO: 10.83 K/UL — HIGH (ref 4.8–10.8)

## 2024-06-13 PROCEDURE — 93306 TTE W/DOPPLER COMPLETE: CPT | Mod: 26

## 2024-06-13 PROCEDURE — 99232 SBSQ HOSP IP/OBS MODERATE 35: CPT

## 2024-06-13 RX ADMIN — LISINOPRIL 40 MILLIGRAM(S): 2.5 TABLET ORAL at 05:35

## 2024-06-13 RX ADMIN — SIMVASTATIN 20 MILLIGRAM(S): 20 TABLET, FILM COATED ORAL at 22:40

## 2024-06-13 RX ADMIN — Medication 150 MICROGRAM(S): at 05:36

## 2024-06-13 RX ADMIN — Medication 100 MILLIGRAM(S): at 17:35

## 2024-06-13 RX ADMIN — Medication 166.67 MILLIGRAM(S): at 05:34

## 2024-06-13 RX ADMIN — ENOXAPARIN SODIUM 40 MILLIGRAM(S): 100 INJECTION SUBCUTANEOUS at 06:03

## 2024-06-13 RX ADMIN — CEFEPIME 100 MILLIGRAM(S): 1 INJECTION, POWDER, FOR SOLUTION INTRAMUSCULAR; INTRAVENOUS at 05:35

## 2024-06-13 NOTE — PROGRESS NOTE ADULT - SUBJECTIVE AND OBJECTIVE BOX
----------Daily Progress Note----------    HISTORY OF PRESENT ILLNESS:  62 y/o man w PMHx of HTN, HLD, DM2, hypothyroidism, COVID 2/2024, had onset of fever and cough 5 days prior to admission, went to urgent care 6/8/24 and had flu/COVID negative, presented to ED for continued fevers and found to have fever 102.9, CTA PE protocol negative for PE, no new consolidations, given Vanc Cefepime, ECG notable for PVCs, admitted to telemetry for further evaluation.   HCP wife Rufina Suarez [phone]    At onset, pt noted fever and some diffuse aches/back pain. Also had dry cough, no sensation of chest congestion. CP is described as 6 or 7/10 at worst, localized to L side, not reproducible, not positionally worsened. On ROS reports diffuse headache, brain fog, loss of appetite. Denies rhinorrhea, nasal congestion.   Denies any recent known sick contacts, though mother is in nursing home. Lives w wife at home, no children, no pets. Has small yard, but denies any insect bites. No mold noted at home. No recent travel, changes in lifestyle. No urinary symptoms. No diarrhea. No recent cocaine/drug use. Nonsmoker.    Triage vitals were: 175/69, 103bpm, RR18, 96% on RA, 99.4   Repeat vitals notable for: tmax 102.9   Labs notable for: WBC 9.6, lactate 1.2, trop 22   Imaging: CTA PE protocol negative for PE, no new consolidations. Hepatic steatosis.   Treatments: Vanc Cefepime   Pending: Janes Mo     Today is hospital day 1d.     INTERVAL HOSPITAL COURSE / OVERNIGHT EVENTS:  O/N events:      24 hr events:      Patient was examined and seen at bedside.    Review of Systems: Otherwise unremarkable     <<<<<PAST MEDICAL & SURGICAL HISTORY>>>>>  HTN (hypertension)    HLD (hyperlipidemia)    Hypothyroidism    Hepatic steatosis    No significant past surgical history      ALLERGIES  No Known Allergies      Home Medications:  lisinopril 40 mg oral tablet: 1 tab(s) orally once a day (12 Jun 2024 06:29)  metFORMIN 500 mg oral tablet: 1 tab(s) orally 2 times a day (12 Jun 2024 06:29)  simvastatin 20 mg oral tablet: 1 tab(s) orally once a day (at bedtime) (12 Jun 2024 06:29)  Synthroid 150 mcg (0.15 mg) oral tablet: 1 tab(s) orally once a day (12 Jun 2024 06:29)  testosterone 20.25 mg/1.25 g (1.62%) transdermal gel: 1 packet(s) transdermally (12 Jun 2024 06:29)        MEDICATIONS  STANDING MEDICATIONS  cefepime   IVPB 2000 milliGRAM(s) IV Intermittent every 12 hours  dextrose 10% Bolus 125 milliLiter(s) IV Bolus once  dextrose 5%. 1000 milliLiter(s) IV Continuous <Continuous>  dextrose 5%. 1000 milliLiter(s) IV Continuous <Continuous>  dextrose 50% Injectable 25 Gram(s) IV Push once  dextrose 50% Injectable 12.5 Gram(s) IV Push once  enoxaparin Injectable 40 milliGRAM(s) SubCutaneous every 24 hours  glucagon  Injectable 1 milliGRAM(s) IntraMuscular once  insulin lispro (ADMELOG) corrective regimen sliding scale   SubCutaneous three times a day before meals  insulin lispro (ADMELOG) corrective regimen sliding scale   SubCutaneous at bedtime  levothyroxine 150 MICROGram(s) Oral daily  lisinopril 40 milliGRAM(s) Oral daily  simvastatin 20 milliGRAM(s) Oral at bedtime  vancomycin  IVPB 1250 milliGRAM(s) IV Intermittent every 12 hours    PRN MEDICATIONS  acetaminophen     Tablet .. 975 milliGRAM(s) Oral every 8 hours PRN  dextrose Oral Gel 15 Gram(s) Oral once PRN  ibuprofen  Tablet. 600 milliGRAM(s) Oral every 8 hours PRN  melatonin 3 milliGRAM(s) Oral at bedtime PRN    VITALS:  T(F): 99.7  HR: 89  BP: 147/77  RR: 18  SpO2: 95%    <<<<<PHYSICAL EXAM>>>>>  GENERAL: Well developed, well nourished and in no acute distress. Resting comfortably in bed.  HEENT: Normocephalic, atraumatic, mucous membranes moist, EOMI, PERRLA, bilateral sclera anicteric, no conjunctival injection  Neck: Supple, non-tender, no lymphadenopathy.  PULMONARY: Clear to auscultation bilaterally. No rales, rhonchi, or wheezing.  CARDIOVASCULAR: Regular rate and rhythm, S1-S2, no murmurs  GASTROINTESTINAL: Soft, non-tender, non-distended, no guarding.  RENAL: No CVA tenderness.  SKIN/EXTREMITIES: No clubbing or edema  NEUROLOGIC/MUSCULOSKELETAL: AOx4, grossly moving all extremities, no focal deficits.    <<<<<LABS>>>>>                        12.0   9.58  )-----------( 262      ( 12 Jun 2024 05:21 )             36.3     06-12    139  |  101  |  11  ----------------------------<  120<H>  4.5   |  26  |  1.0    Ca    8.5      12 Jun 2024 05:21  Mg     2.3     06-12    TPro  6.0  /  Alb  3.6  /  TBili  0.4  /  DBili  x   /  AST  41  /  ALT  59<H>  /  AlkPhos  131<H>  06-12      Urinalysis Basic - ( 12 Jun 2024 05:21 )    Color: x / Appearance: x / SG: x / pH: x  Gluc: 120 mg/dL / Ketone: x  / Bili: x / Urobili: x   Blood: x / Protein: x / Nitrite: x   Leuk Esterase: x / RBC: x / WBC x   Sq Epi: x / Non Sq Epi: x / Bacteria: x        Sedimentation Rate, Erythrocyte: 78 mm/Hr *H* (06-12-24 @ 11:44)      Urinalysis with Rflx Culture (collected 11 Jun 2024 22:49)    [id number]        <<<<<RADIOLOGY>>>>>  CXR (6/11): No radiographic evidence of acute cardiopulmonary disease.    CTA PE (6/11): No evidence of acute pulmonary embolus    RUQ Abd US (6/12): Cholelithiasis without sonographic evidence of acute cholecystitis. Echogenic liver suggestive of fatty infiltration (hepatic steatosis).        ----------------------------------------------------------------------------------------------------------------------------------------------------------------------------------------------- ----------Daily Progress Note----------    HISTORY OF PRESENT ILLNESS:  62 y/o man w PMHx of HTN, HLD, DM2, hypothyroidism, COVID 2/2024, had onset of fever and cough 5 days prior to admission, went to urgent care 6/8/24 and had flu/COVID negative, presented to ED for continued fevers and found to have fever 102.9, w/associated diffuse aches, back pain, dry cough, and 7/10 chest pain localized to the L side, not reproducible nor worsened with position. Also notes diffuse headache, brain fog, and loss of appetite. Denies any dysuria or diarrhea. Denies sick contacts, has no pets, has a small yard but denies insect bites, no recent travel. In the ED, he was hypertensive to 175/69, tachycardic to 103, had a fever of 102.0, labs notable for WBC 9.6, lactate 1.2, and trop 22 -> 19, EKG showing sinus tachycardia with frequent PVCs, CXR unremarkable, and CTA PE protocol negative for PE with no new consolidations. He was given Vanc Cefepime in the ED and admitted to telemetry for work up and management of fever of unknown origin with EKG changes.    HCP wife Rufina Suarez [phone]     Today is hospital day 1d.     INTERVAL HOSPITAL COURSE / OVERNIGHT EVENTS:  O/N events:      24 hr events:      Patient was examined and seen at bedside.    Review of Systems: Otherwise unremarkable     <<<<<PAST MEDICAL & SURGICAL HISTORY>>>>>  HTN (hypertension)    HLD (hyperlipidemia)    Hypothyroidism    Hepatic steatosis    No significant past surgical history      ALLERGIES  No Known Allergies      Home Medications:  lisinopril 40 mg oral tablet: 1 tab(s) orally once a day (12 Jun 2024 06:29)  metFORMIN 500 mg oral tablet: 1 tab(s) orally 2 times a day (12 Jun 2024 06:29)  simvastatin 20 mg oral tablet: 1 tab(s) orally once a day (at bedtime) (12 Jun 2024 06:29)  Synthroid 150 mcg (0.15 mg) oral tablet: 1 tab(s) orally once a day (12 Jun 2024 06:29)  testosterone 20.25 mg/1.25 g (1.62%) transdermal gel: 1 packet(s) transdermally (12 Jun 2024 06:29)        MEDICATIONS  STANDING MEDICATIONS  cefepime   IVPB 2000 milliGRAM(s) IV Intermittent every 12 hours  dextrose 10% Bolus 125 milliLiter(s) IV Bolus once  dextrose 5%. 1000 milliLiter(s) IV Continuous <Continuous>  dextrose 5%. 1000 milliLiter(s) IV Continuous <Continuous>  dextrose 50% Injectable 25 Gram(s) IV Push once  dextrose 50% Injectable 12.5 Gram(s) IV Push once  enoxaparin Injectable 40 milliGRAM(s) SubCutaneous every 24 hours  glucagon  Injectable 1 milliGRAM(s) IntraMuscular once  insulin lispro (ADMELOG) corrective regimen sliding scale   SubCutaneous three times a day before meals  insulin lispro (ADMELOG) corrective regimen sliding scale   SubCutaneous at bedtime  levothyroxine 150 MICROGram(s) Oral daily  lisinopril 40 milliGRAM(s) Oral daily  simvastatin 20 milliGRAM(s) Oral at bedtime  vancomycin  IVPB 1250 milliGRAM(s) IV Intermittent every 12 hours    PRN MEDICATIONS  acetaminophen     Tablet .. 975 milliGRAM(s) Oral every 8 hours PRN  dextrose Oral Gel 15 Gram(s) Oral once PRN  ibuprofen  Tablet. 600 milliGRAM(s) Oral every 8 hours PRN  melatonin 3 milliGRAM(s) Oral at bedtime PRN    VITALS:  T(F): 99.7  HR: 89  BP: 147/77  RR: 18  SpO2: 95%    <<<<<PHYSICAL EXAM>>>>>  GENERAL: Well developed, well nourished and in no acute distress. Resting comfortably in bed.  HEENT: Normocephalic, atraumatic, mucous membranes moist, EOMI, PERRLA, bilateral sclera anicteric, no conjunctival injection  Neck: Supple, non-tender, no lymphadenopathy.  PULMONARY: Clear to auscultation bilaterally. No rales, rhonchi, or wheezing.  CARDIOVASCULAR: Regular rate and rhythm, S1-S2, no murmurs  GASTROINTESTINAL: Soft, non-tender, non-distended, no guarding.  RENAL: No CVA tenderness.  SKIN/EXTREMITIES: No clubbing or edema  NEUROLOGIC/MUSCULOSKELETAL: AOx4, grossly moving all extremities, no focal deficits.    <<<<<LABS>>>>>                        12.0   9.58  )-----------( 262      ( 12 Jun 2024 05:21 )             36.3     06-12    139  |  101  |  11  ----------------------------<  120<H>  4.5   |  26  |  1.0    Ca    8.5      12 Jun 2024 05:21  Mg     2.3     06-12    TPro  6.0  /  Alb  3.6  /  TBili  0.4  /  DBili  x   /  AST  41  /  ALT  59<H>  /  AlkPhos  131<H>  06-12      Urinalysis Basic - ( 12 Jun 2024 05:21 )    Color: x / Appearance: x / SG: x / pH: x  Gluc: 120 mg/dL / Ketone: x  / Bili: x / Urobili: x   Blood: x / Protein: x / Nitrite: x   Leuk Esterase: x / RBC: x / WBC x   Sq Epi: x / Non Sq Epi: x / Bacteria: x        Sedimentation Rate, Erythrocyte: 78 mm/Hr *H* (06-12-24 @ 11:44)      Urinalysis with Rflx Culture (collected 11 Jun 2024 22:49)    [id number]        <<<<<RADIOLOGY>>>>>  CXR (6/11): No radiographic evidence of acute cardiopulmonary disease.    CTA PE (6/11): No evidence of acute pulmonary embolus    RUQ Abd US (6/12): Cholelithiasis without sonographic evidence of acute cholecystitis. Echogenic liver suggestive of fatty infiltration (hepatic steatosis).        ----------------------------------------------------------------------------------------------------------------------------------------------------------------------------------------------- ----------Daily Progress Note----------    HISTORY OF PRESENT ILLNESS:  64 y/o man w PMHx of HTN, HLD, DM2, hypothyroidism, COVID 2/2024, had onset of fever and cough 5 days prior to admission, went to urgent care 6/8/24 and had flu/COVID negative, presented to ED for continued fevers and found to have fever 102.9, w/associated diffuse aches, back pain, dry cough, and 7/10 chest pain localized to the L side, not reproducible nor worsened with position. Also notes diffuse headache, brain fog, and loss of appetite. Denies any dysuria or diarrhea. Denies sick contacts, has no pets, has a small yard but denies insect bites, no recent travel. In the ED, he was hypertensive to 175/69, tachycardic to 103, had a fever of 102.0, labs notable for WBC 9.6, lactate 1.2, and trop 22 -> 19, EKG showing sinus tachycardia with frequent PVCs, CXR unremarkable, and CTA PE protocol negative for PE with no new consolidations. He was given Vanc Cefepime in the ED and admitted to telemetry for work up and management of fever of unknown origin with EKG changes.    HCP wife Rufina Suarez [phone]     Today is hospital day 1d.     INTERVAL HOSPITAL COURSE / OVERNIGHT EVENTS:  O/N events:  None    24 hr events:  None    Patient was examined and seen at bedside. Reports improvement in chest pain and back pain, reports having his appetite back for the first time in days, reports still having some chest tightness, still has a slight fever overnight, endorsed BM this AM, denies any other issues    Review of Systems: Otherwise unremarkable     <<<<<PAST MEDICAL & SURGICAL HISTORY>>>>>  HTN (hypertension)    HLD (hyperlipidemia)    Hypothyroidism    Hepatic steatosis    No significant past surgical history      ALLERGIES  No Known Allergies      Home Medications:  lisinopril 40 mg oral tablet: 1 tab(s) orally once a day (12 Jun 2024 06:29)  metFORMIN 500 mg oral tablet: 1 tab(s) orally 2 times a day (12 Jun 2024 06:29)  simvastatin 20 mg oral tablet: 1 tab(s) orally once a day (at bedtime) (12 Jun 2024 06:29)  Synthroid 150 mcg (0.15 mg) oral tablet: 1 tab(s) orally once a day (12 Jun 2024 06:29)  testosterone 20.25 mg/1.25 g (1.62%) transdermal gel: 1 packet(s) transdermally (12 Jun 2024 06:29)        MEDICATIONS  STANDING MEDICATIONS  cefepime   IVPB 2000 milliGRAM(s) IV Intermittent every 12 hours  dextrose 10% Bolus 125 milliLiter(s) IV Bolus once  dextrose 5%. 1000 milliLiter(s) IV Continuous <Continuous>  dextrose 5%. 1000 milliLiter(s) IV Continuous <Continuous>  dextrose 50% Injectable 25 Gram(s) IV Push once  dextrose 50% Injectable 12.5 Gram(s) IV Push once  enoxaparin Injectable 40 milliGRAM(s) SubCutaneous every 24 hours  glucagon  Injectable 1 milliGRAM(s) IntraMuscular once  insulin lispro (ADMELOG) corrective regimen sliding scale   SubCutaneous three times a day before meals  insulin lispro (ADMELOG) corrective regimen sliding scale   SubCutaneous at bedtime  levothyroxine 150 MICROGram(s) Oral daily  lisinopril 40 milliGRAM(s) Oral daily  simvastatin 20 milliGRAM(s) Oral at bedtime  vancomycin  IVPB 1250 milliGRAM(s) IV Intermittent every 12 hours    PRN MEDICATIONS  acetaminophen     Tablet .. 975 milliGRAM(s) Oral every 8 hours PRN  dextrose Oral Gel 15 Gram(s) Oral once PRN  ibuprofen  Tablet. 600 milliGRAM(s) Oral every 8 hours PRN  melatonin 3 milliGRAM(s) Oral at bedtime PRN    VITALS:  T(F): 99.7  HR: 89  BP: 147/77  RR: 18  SpO2: 95%    <<<<<PHYSICAL EXAM>>>>>  GENERAL: NAD  PULMONARY: Clear to auscultation bilaterally. No wheezing or crackles  CARDIOVASCULAR: Regular rate and rhythm, S1-S2, no murmurs, rubs, or gallops  GASTROINTESTINAL: Soft, non-tender, non-distended, no guarding  SKIN/EXTREMITIES: Warm, 2+ tibialis posterior pulses, no UE or LE edema, no rash  NEUROLOGIC/MUSCULOSKELETAL: AOx4, grossly moving all extremities, no focal deficits.    <<<<<LABS>>>>>                        12.0   9.58  )-----------( 262      ( 12 Jun 2024 05:21 )             36.3     06-12    139  |  101  |  11  ----------------------------<  120<H>  4.5   |  26  |  1.0    Ca    8.5      12 Jun 2024 05:21  Mg     2.3     06-12    TPro  6.0  /  Alb  3.6  /  TBili  0.4  /  DBili  x   /  AST  41  /  ALT  59<H>  /  AlkPhos  131<H>  06-12      Urinalysis Basic - ( 12 Jun 2024 05:21 )    Color: x / Appearance: x / SG: x / pH: x  Gluc: 120 mg/dL / Ketone: x  / Bili: x / Urobili: x   Blood: x / Protein: x / Nitrite: x   Leuk Esterase: x / RBC: x / WBC x   Sq Epi: x / Non Sq Epi: x / Bacteria: x        Sedimentation Rate, Erythrocyte: 78 mm/Hr *H* (06-12-24 @ 11:44)      Urinalysis with Rflx Culture (collected 11 Jun 2024 22:49)    [id number]        <<<<<RADIOLOGY>>>>>  CXR (6/11): No radiographic evidence of acute cardiopulmonary disease.    CTA PE (6/11): No evidence of acute pulmonary embolus    RUQ Abd US (6/12): Cholelithiasis without sonographic evidence of acute cholecystitis. Echogenic liver suggestive of fatty infiltration (hepatic steatosis).        -----------------------------------------------------------------------------------------------------------------------------------------------------------------------------------------------

## 2024-06-13 NOTE — PROGRESS NOTE ADULT - ASSESSMENT
64 y/o man w PMHx of HTN, HLD, DM2, hypothyroidism, COVID 2/2024, had onset of fever and cough 5 days prior to admission, went to urgent care 6/8/24 and had flu/COVID negative, presented to ED for continued fevers and found to have fever 102.9, CTA PE protocol negative for PE, no new consolidations, given Vanc Cefepime, ECG notable for PVCs, admitted to telemetry for further evaluation.   HCP wife Rufina Suarez [phone]    #Fever, cough, and chest pain  #Lyme disease  SIRS(+) for fever, tachycardia. No tachypnea, leukocytosis.   DDx includes viral syndrome, less likely pericarditis (no positional component, no significant EKG changes, no effusion on CT). No consolidations or cavitations noted on CT. No PE. ACS unlikely; trops 22 and 19, no significant ECG changes.   - BCx pending   - ESR 71, .2, procal 0.4   - EKG: NSR with frequent PVCs  - Trop 22 -> 19 (6/12)  - RVP negative, UA neg  - Lyme C6 positive, Lyme IgG positive, Lyme IgM positive  - Received Vanc Cefepime in the ED, continue vanc 1250 mg q12h and cefepime 2 g q12h  - Stop vanc and cefepime, start ceftriaxone?  - Pending ID consult  - RF 13 (6/12)  - pain control w tylenol  - TTE ordered to eval for effusion, but no effusion on CTA     #HTN   - continue Lisinopril     #HLD   - continue home statin     #DM2   - metformin, 1+ SS while in hospital     #hypothyroidism   - TSH 0.61  - cont home meds  - f/u outpatient for repeat TSH    #MISC  - DVT prophylaxis: Lovenox 40mg   - GI prophylaxis: n/a   - Diet: Regular DASH TLC CC   - Activity: Ambulate as tolerated  - Code status: FULL CODE   - Disposition: Telemetry                                                62 y/o man w PMHx of HTN, HLD, DM2, hypothyroidism, COVID 2/2024, had onset of fever and cough 5 days prior to admission, went to urgent care 6/8/24 and had flu/COVID negative, presented to ED for continued fevers and found to have fever 102.9, w/associated diffuse aches, back pain, dry cough, and 7/10 chest pain localized to the L side, not reproducible nor worsened with position. Also notes diffuse headache, brain fog, and loss of appetite. Denies any dysuria or diarrhea. Denies sick contacts, has no pets, has a small yard but denies insect bites, no recent travel. In the ED, he was hypertensive to 175/69, tachycardic to 103, had a fever of 102.0, labs notable for WBC 9.6, lactate 1.2, and trop 22 -> 19, EKG showing sinus tachycardia with frequent PVCs, CXR unremarkable, and CTA PE protocol negative for PE with no new consolidations. He was given Vanc Cefepime in the ED and admitted to telemetry for work up and management of fever of unknown origin with EKG changes.    HCP wife Rufina Suarez [phone]    #Fever, cough, and chest pain  #Lyme disease  SIRS(+) for fever, tachycardia. No tachypnea, leukocytosis.   DDx includes viral syndrome, less likely pericarditis (no positional component, no significant EKG changes, no effusion on CT). No consolidations or cavitations noted on CT. No PE. ACS unlikely; trops 22 and 19, no significant ECG changes.   - BCx pending   - ESR 71, .2, procal 0.4   - EKG: NSR with frequent PVCs  - Trop 22 -> 19 (6/12)  - RVP negative, UA neg  - Lyme C6 positive, Lyme IgG positive, Lyme IgM positive  - Received Vanc Cefepime in the ED, continue vanc 1250 mg q12h and cefepime 2 g q12h  - Stop vanc and cefepime, start ceftriaxone?  - Pending ID consult  - RF 13 (6/12)  - pain control w tylenol  - TTE ordered to eval for effusion, but no effusion on CTA     #HTN   - continue Lisinopril     #HLD   - continue home statin     #DM2   - metformin, 1+ SS while in hospital     #hypothyroidism   - TSH 0.61  - cont home meds  - f/u outpatient for repeat TSH    #MISC  - DVT prophylaxis: Lovenox 40mg   - GI prophylaxis: n/a   - Diet: Regular DASH TLC CC   - Activity: Ambulate as tolerated  - Code status: FULL CODE     Pending: ID eval, stop cefepime and vanc and start ceftriaxone?                                                62 y/o man w PMHx of HTN, HLD, DM2, hypothyroidism, COVID 2/2024, had onset of fever and cough 5 days prior to admission, went to urgent care 6/8/24 and had flu/COVID negative, presented to ED for continued fevers and found to have fever 102.9, w/associated diffuse aches, back pain, dry cough, and 7/10 chest pain localized to the L side, not reproducible nor worsened with position. Also notes diffuse headache, brain fog, and loss of appetite. Denies any dysuria or diarrhea. Denies sick contacts, has no pets, has a small yard but denies insect bites, no recent travel. In the ED, he was hypertensive to 175/69, tachycardic to 103, had a fever of 102.0, labs notable for WBC 9.6, lactate 1.2, and trop 22 -> 19, EKG showing sinus tachycardia with frequent PVCs, CXR unremarkable, and CTA PE protocol negative for PE with no new consolidations. He was given Vanc Cefepime in the ED and admitted to telemetry for work up and management of fever of unknown origin with EKG changes.    HCP wife Rufina Suarez [phone]    #Fever, cough, and chest pain  #Lyme disease  SIRS(+) for fever, tachycardia. No tachypnea, leukocytosis.   DDx includes viral syndrome, less likely pericarditis (no positional component, no significant EKG changes, no effusion on CT). No consolidations or cavitations noted on CT. No PE. ACS unlikely; trops 22 and 19, no significant ECG changes.   - BCx pending   - ESR 71, .2, procal 0.4   - EKG: NSR with frequent PVCs  - Trop 22 -> 19 (6/12)  - RVP negative, UA neg  - Lyme C6 positive, Lyme IgG positive, Lyme IgM positive  - Received Vanc Cefepime in the ED, continue vanc 1250 mg q12h and cefepime 2 g q12h  - Stop vanc and cefepime, start ceftriaxone?  - Pending ID consult  - RF 13 (6/12)  - pain control w tylenol  - TTE ordered to eval for effusion, but no effusion on CTA     #Transaminitis  #Hepatic steatosis  - AST 53, ALT 61  - RUQ Abd shows cholelithiasis w/o cholecystitis, shows hepatic steatosis  - f/u outpatient for hepatic steatosis    #HTN   - continue Lisinopril     #HLD   - continue home statin     #DM2   - metformin, 1+ SS while in hospital     #hypothyroidism   - TSH 0.61  - cont home meds  - f/u outpatient for repeat TSH    #MISC  - DVT prophylaxis: Lovenox 40mg   - GI prophylaxis: n/a   - Diet: Regular DASH TLC CC   - Activity: Ambulate as tolerated  - Code status: FULL CODE     Pending: ID eval, stop cefepime and vanc and start ceftriaxone?

## 2024-06-13 NOTE — CONSULT NOTE ADULT - ASSESSMENT
ASSESSMENT  62 y/o man w PMHx of HTN, HLD, DM2, hypothyroidism, COVID 2/2024, had onset of fever and cough 5 days prior to admission, went to urgent care 6/8/24 and had flu/COVID negative, presented to ED for continued fevers and found to have fever 102.9, CTA PE protocol negative for PE, no new consolidations, given Vanc Cefepime    IMPRESSION  #Sepsis present on admission T>101F, Pulse>90  CTA Chest : No evidence of acute pulmonary embolus , No acute Lobar consoildations   UA (-)  Lactate (-)  RVP (-)  Lyme C6 , IgM and IgG +     Blood cx (-) on 6/11   #Immunosuppression present secondary to DM  #Hypothyroidism   #HTN  #Hepatic Steatosis   RUQ US with Cholelithiasis without sonographic evidence of acute cholecystitis. Echogenic liver suggestive of fatty infiltration (hepatic steatosis).    Antibiotics allergies :   None       RECOMMENDATIONS  -  -     This is a pended note. All final recommendations to follow pending discussion with ID Attending    ASSESSMENT  64 y/o man w PMHx of HTN, HLD, DM2, hypothyroidism, COVID 2/2024, had onset of fever , myalgias , arthralgia , sweating and chest tightness 5 days prior to admission, went to urgent care 6/8/24 and had flu/COVID negative, presented to ED for continued fevers and found to have fever 102.9, CTA PE protocol negative for PE, no new consolidations, given Vanc Cefepime.   Of note he states that he remembers being stung/bitten by something on his arm one week prior to onset of symptoms     IMPRESSION  #Sepsis present on admission T>101F, Pulse>90  CTA Chest : No evidence of acute pulmonary embolus , No acute Lobar consolidations   Lyme C6 , IgM and IgG +     UA (-)  Lactate (-)  RVP (-)  Blood cx (-) on 6/11   #Immunosuppression present secondary to DM  #Hypothyroidism   #HTN  #Hepatic Steatosis   RUQ US with Cholelithiasis without sonographic evidence of acute cholecystitis. Echogenic liver suggestive of fatty infiltration (hepatic steatosis).    Antibiotics allergies :   None       RECOMMENDATIONS  -  -     This is a pended note. All final recommendations to follow pending discussion with ID Attending    ASSESSMENT  64 y/o man w PMHx of HTN, HLD, DM2, hypothyroidism, COVID 2/2024, had onset of fever , myalgias , arthralgia , sweating and chest tightness 5 days prior to admission, went to urgent care 6/8/24 and had flu/COVID negative, presented to ED for continued fevers and found to have fever 102.9, CTA PE protocol negative for PE, no new consolidations, given Vanc Cefepime.   Of note he states that he remembers being stung/bitten by something on his arm one week prior to onset of symptoms     IMPRESSION  #Sepsis POA likely secondary to lyme disease     CTA Chest : No evidence of acute pulmonary embolus , No acute Lobar consolidations   Lyme C6 , IgM and IgG +     UA (-)  Lactate (-)  RVP (-)  Blood cx (-) on 6/11   #Immunosuppression present secondary to DM  #Hypothyroidism   #HTN  #Hepatic Steatosis   RUQ US with Cholelithiasis without sonographic evidence of acute cholecystitis. Echogenic liver suggestive of fatty infiltration (hepatic steatosis).    Antibiotics allergies :   None       RECOMMENDATIONS  - start doxycyline 100 mg Q12 PO for 14 days , end date 6/27   - dc other antibiotics   - repeat convalescent Lyme disease testing in 4 weeks      ASSESSMENT  64 y/o man w PMHx of HTN, HLD, DM2, hypothyroidism, COVID 2/2024, had onset of fever , myalgias , arthralgia , sweating and chest tightness 5 days prior to admission, went to urgent care 6/8/24 and had flu/COVID negative, presented to ED for continued fevers and found to have fever 102.9, CTA PE protocol negative for PE, no new consolidations, given Vanc Cefepime.   Of note he states that he remembers being stung/bitten by something on his arm one week prior to onset of symptoms     IMPRESSION  #Sepsis POA likely secondary to lyme disease     CTA Chest : No evidence of acute pulmonary embolus , No acute Lobar consolidations   Lyme C6 , IgM and IgG +     UA (-)  Lactate (-)  RVP (-)  Blood cx (-) on 6/11   #Immunosuppression present secondary to DM  #Hypothyroidism   #HTN  #Hepatic Steatosis   RUQ US with Cholelithiasis without sonographic evidence of acute cholecystitis. Echogenic liver suggestive of fatty infiltration (hepatic steatosis).    Antibiotics allergies :   None       RECOMMENDATIONS  - start doxycyline 100 mg Q12 PO for 14 days , end date 6/27   - dc other antibiotics   - repeat convalescent Lyme disease testing in 4 weeks   - ID follow-up with Dr. Dustin Saab for Telehealth. We will call the patient between 10:30-6:30      [address]       [phone]       Fax [phone]

## 2024-06-13 NOTE — PHARMACOTHERAPY INTERVENTION NOTE - COMMENTS
I spoke with Dr Allen and recommended to adjust dose of vancomycin from 1500 mgq12h to 1250 mg as per PK calculations. Dr Allen will adjust the dose
Recommend discontinuing vancomycin as per ID consult recommendations. 
Vancomycin 1250 mg q12h  Peak= 26.9  Trough= 16.2
Recommended discontinuing cefepime as per ID Consult recommendations.
Recommended doxycycline 100mg PO BID as per ID consult recommendations.

## 2024-06-13 NOTE — PROGRESS NOTE ADULT - ATTENDING COMMENTS
Patient seen at bedside. Changes made within note as well. Discussed with medical team that includes resident(s), medical student(s), nursing staff, case management.     as per initial summary     64 y/o man w PMHx of HTN, HLD, DM2, hypothyroidism, COVID 2/2024, had onset of fever and cough 5 days prior to admission, went to urgent care 6/8/24 and had flu/COVID negative, presented to ED for continued fevers and found to have fever 102.9, CTA PE protocol negative for PE, no new consolidations, given Vanc Cefepime, ECG notable for PVCs, admitted to telemetry for further evaluation.   HCP wife Rufina Suarez [phone]    #Fever  #Cough   SIRS(+) for fever, tachycardia. No tachypnea, leukocytosis.   ID consult,   h/o some night sweats. no lymphoadenopathy  follow ID recommendations , pending. lyme IGM antibody positive   no chest pain or discomfort during my interview     #HTN - continue Lisinopril   #HLD - continue home statin   #DM2 - metformin, 1+ SS while in hospital   #hypothyroidism - cont home meds, f/u TSH                                                                               ----------------------------------------------------  # DVT prophylaxis: Lovenox 40mg   # GI prophylaxis: n/a   # Diet: Regular DASH TLC CC   # Activity: Ambulate as tolerated  # Code status: FULL CODE   # Disposition: Telemetry                                                                           --------------------------------------------------------    follow up: acute, still having fever spikes monitor fever spikes continue iv abx. follow ID . lyme igm positive

## 2024-06-13 NOTE — CONSULT NOTE ADULT - ATTENDING COMMENTS
I have personally seen and examined this patient.  I have fully participated in the care of this patient.  I have reviewed all pertinent clinical information, including history, physical exam, plan and note.  I have reviewed all pertinent clinical information and reviewed all relevant imaging and diagnostic studies personally.  My addendum includes the final recommendations and supersedes the resident's note.       Fever x 5 days with CP, no URI symptoms, no diarrhea, no urinary symptoms, +HA   WBC 9- No leukocytosis     BCX NG     procalcitonin 0.40     UA without significant pyuria   Sepsis ruled out on admission   +Lyme IgM/IgG- does not spend much time outside , unclear significance, would check convalescent titers in 4-6 weeks  Treat with empiric doxy PO as benefit outweighs risk of not treating

## 2024-06-13 NOTE — CONSULT NOTE ADULT - SUBJECTIVE AND OBJECTIVE BOX
ROX SUAREZ  63y, Male  Allergy: Gluten (Stomach Upset)  Drug Allergies Not Recorded    CHIEF COMPLAINT: fever, URI symptoms (13 Jun 2024 05:09)    HPI:  HPI:  62 y/o man w PMHx of HTN, HLD, DM2, hypothyroidism, COVID 2/2024, had onset of fever and cough 5 days prior to admission, went to urgent care 6/8/24 and had flu/COVID negative, presented to ED for continued fevers and found to have fever 102.9, CTA PE protocol negative for PE, no new consolidations, given Vanc Cefepime, ECG notable for PVCs, admitted to telemetry for further evaluation.   HCP wife Rufina Suarez [phone]    At onset, pt noted fever and some diffuse aches/back pain. Also had dry cough, no sensation of chest congestion. CP is described as 6 or 7/10 at worst, localized to L side, not reproducible, not positionally worsened. On ROS reports diffuse headache, brain fog, loss of appetite. Denies rhinorrhea, nasal congestion.   Denies any recent known sick contacts, though mother is in nursing home. Lives w wife at home, no children, no pets. Has small yard, but denies any insect bites. No mold noted at home. No recent travel, changes in lifestyle. No urinary symptoms. No diarrhea. No recent cocaine/drug use. Nonsmoker.    Triage vitals were: 175/69, 103bpm, RR18, 96% on RA, 99.4   Repeat vitals notable for: tmax 102.9   Labs notable for: WBC 9.6, lactate 1.2, trop 22   Imaging: CTA PE protocol negative for PE, no new consolidations. Hepatic steatosis.   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, few other places   , lives w wife (12 Jun 2024 06:30)      Recent Travel:  Other Exposures:     ROS  General: Denies rigors, Rare nightsweats  HEENT: +headache,Denies rhinorrhea, sore throat, eye pain  CV: now Denies CP, palpitations  PULM: + Cough , Denies wheezing, hemoptysis  GI: Denies hematemesis, hematochezia, melena  : Denies discharge, hematuria  MSK: +myalgias and back pain   SKIN: Denies rash, lesions  NEURO: +headache , Denies paresthesias, weakness  PSYCH: Denies depression, anxiety    VITALS:  T(F): 99.7, Max: 100.4 (06-12-24 @ 10:20)  HR: 89  BP: 147/77  RR: 18Vital Signs Last 24 Hrs  T(C): 37.6 (13 Jun 2024 04:09), Max: 38 (12 Jun 2024 10:20)  T(F): 99.7 (13 Jun 2024 04:09), Max: 100.4 (12 Jun 2024 10:20)  HR: 89 (13 Jun 2024 04:09) (85 - 97)  BP: 147/77 (13 Jun 2024 04:09) (125/65 - 147/77)  BP(mean): --  RR: 18 (13 Jun 2024 04:09) (18 - 18)  SpO2: 95% (13 Jun 2024 04:09) (95% - 98%)    Parameters below as of 12 Jun 2024 20:20  Patient On (Oxygen Delivery Method): room air        PHYSICAL EXAM:  Gen: NAD, resting in bed  HEENT: Normocephalic, atraumatic  Neck: supple, no lymphadenopathy  CV: Regular rate & regular rhythm  Lungs: CTAB  Abdomen: Soft, BS present  Ext: Warm, well perfused , Trace edema   Neuro: non focal, awake  Skin: no rash, no lesions  Lines: no phlebitis    TESTS & MEASUREMENTS:                        12.4   10.83 )-----------( 305      ( 13 Jun 2024 07:25 )             37.4     06-12    139  |  101  |  11  ----------------------------<  120<H>  4.5   |  26  |  1.0    Ca    8.5      12 Jun 2024 05:21  Mg     2.3     06-12    TPro  6.0  /  Alb  3.6  /  TBili  0.4  /  DBili  x   /  AST  41  /  ALT  59<H>  /  AlkPhos  131<H>  06-12      LIVER FUNCTIONS - ( 12 Jun 2024 05:21 )  Alb: 3.6 g/dL / Pro: 6.0 g/dL / ALK PHOS: 131 U/L / ALT: 59 U/L / AST: 41 U/L / GGT: x           Urinalysis Basic - ( 12 Jun 2024 05:21 )    Color: x / Appearance: x / SG: x / pH: x  Gluc: 120 mg/dL / Ketone: x  / Bili: x / Urobili: x   Blood: x / Protein: x / Nitrite: x   Leuk Esterase: x / RBC: x / WBC x   Sq Epi: x / Non Sq Epi: x / Bacteria: x        Urinalysis with Rflx Culture (collected 06-11-24 @ 22:49)    Culture - Blood (collected 06-11-24 @ 22:12)  Source: .Blood Blood  Preliminary Report (06-13-24 @ 09:02):    No growth at 24 hours    Culture - Blood (collected 06-11-24 @ 22:12)  Source: .Blood Blood  Preliminary Report (06-13-24 @ 09:02):    No growth at 24 hours        Blood Gas Venous - Lactate: 1.2 mmol/L (06-11-24 @ 22:21)      INFECTIOUS DISEASES TESTING      RADIOLOGY & ADDITIONAL TESTS:  I have personally reviewed the last available Chest xray  CXR  Xray Chest 2 Views PA/Lat:   ACC: [id number] EXAM:  XR CHEST PA LAT 2V   ORDERED BY: JENNIFER BOLDEN     PROCEDURE DATE:  06/11/2024          INTERPRETATION:  Clinical History / Reason for exam: Chest pain    Comparison : Chest radiograph None.    Technique/Positioning: PA and lateral.    Findings:    Support devices: None.    Cardiac/mediastinum/hilum: Unremarkable.    Lung parenchyma/Pleura: Within normal limits.    Skeleton/soft tissues: Unremarkable.    Impression:    No radiographic evidence of acute cardiopulmonary disease.        --- End of Report ---            ELAINE TRENT MD; Attending Radiologist  This document has been electronically signed. Jun 12 2024  2:14PM (06-11-24 @ 22:37)      CT  CT Angio Chest PE Protocol w/ IV Cont:   ACC: [id number] EXAM:  CT ANGIO CHEST PULM ART WAWIC   ORDERED BY: JENNIFER BOLDEN     PROCEDURE DATE:  06/11/2024          INTERPRETATION:  CLINICAL STATEMENT: Chest pain for 4 days. Request made   to evaluate for pulmonary embolism.    TECHNIQUE: Multislice helical sections were obtained from the thoracic   inlet to the lung bases during rapid administration of intravenous   contrast using a CTA protocol. 65 cc administered of Omnipaque 350 (35 cc   discarded). Thin sections were reconstructed through the pulmonary   vasculature. Sagittal and coronal reformatted images were acquired, as   well as MIP reconstructed images.    COMPARISON CT: None.    OTHER STUDIES USED FOR CORRELATION: None.      FINDINGS:    PULMONARY EMBOLUS: No evidence of acute pulmonary embolism.    LUNGS/PLEURA/AIRWAYS: No acute lobar consolidation. No pleural effusion   or pneumothorax.    HEART/VASCULAR: Minimal vascular calcifications. Normal heart size..    MEDIASTINUM/THORACIC NODES: No definite lymphadenopathy.    VISUALIZED UPPER ABDOMEN: Cholelithiasis.    BONES/SOFT TISSUES: Degenerative changes along the vertebral column.      IMPRESSION: No evidence of acute pulmonary embolus    --- End of Report ---            ANTHONY CONTRERAS MD; Attending Radiologist  This document has been electronically signed. Jun 12 2024 12:17AM (06-11-24 @ 23:46)      CARDIOLOGY TESTING  12 Lead ECG:   Ventricular Rate 104 BPM    Atrial Rate 104 BPM    P-R Interval 166 ms    QRS Duration 68 ms    Q-T Interval 320 ms    QTC Calculation(Bazett) 420 ms    P Axis 51 degrees    R Axis 41 degrees    T Axis 28 degrees    Diagnosis Line Sinus tachycardiawith frequent Premature ventricular complexes  Otherwise normal ECG    Confirmed by ZONIA GLASER MD ([id number]) on 6/12/2024 6:53:00 AM (06-11-24 @ 20:38)      All available historical records have been reviewed    MEDICATIONS  cefepime   IVPB 2000  dextrose 10% Bolus 125  dextrose 5%. 1000  dextrose 5%. 1000  dextrose 50% Injectable 25  dextrose 50% Injectable 12.5  enoxaparin Injectable 40  glucagon  Injectable 1  insulin lispro (ADMELOG) corrective regimen sliding scale   insulin lispro (ADMELOG) corrective regimen sliding scale   levothyroxine 150  lisinopril 40  simvastatin 20  vancomycin  IVPB 1250      ANTIBIOTICS:  cefepime   IVPB 2000 milliGRAM(s) IV Intermittent every 12 hours  vancomycin  IVPB 1250 milliGRAM(s) IV Intermittent every 12 hours      All available historical data has been reviewed   ROX SUAREZ  63y, Male  Allergy: Gluten (Stomach Upset)  Drug Allergies Not Recorded    CHIEF COMPLAINT: fever, URI symptoms (13 Jun 2024 05:09)    HPI:  HPI:  64 y/o man w PMHx of HTN, HLD, DM2, hypothyroidism, COVID 2/2024, had onset of fever and cough 5 days prior to admission, went to urgent care 6/8/24 and had flu/COVID negative, presented to ED for continued fevers and found to have fever 102.9, CTA PE protocol negative for PE, no new consolidations, given Vanc Cefepime, ECG notable for PVCs, admitted to telemetry for further evaluation.   HCP wife Rufina Suarez [phone]    At onset, pt noted fever and some diffuse aches/back pain. Also had dry cough, no sensation of chest congestion. CP is described as 6 or 7/10 at worst, localized to L side, not reproducible, not positionally worsened. On ROS reports diffuse headache, brain fog, loss of appetite. Denies rhinorrhea, nasal congestion.   Denies any recent known sick contacts, though mother is in nursing home. Lives w wife at home, no children, no pets. Has small yard, but denies any insect bites. No mold noted at home. No recent travel, changes in lifestyle. No urinary symptoms. No diarrhea. No recent cocaine/drug use. Nonsmoker.    Triage vitals were: 175/69, 103bpm, RR18, 96% on RA, 99.4   Repeat vitals notable for: tmax 102.9   Labs notable for: WBC 9.6, lactate 1.2, trop 22   Imaging: CTA PE protocol negative for PE, no new consolidations. Hepatic steatosis.   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, few other places   , lives w wife (12 Jun 2024 06:30)      Recent Travel:  Other Exposures:     ROS  General: Denies rigors, occasional night sweats  HEENT: headache, Denies rhinorrhea, sore throat, eye pain  CV: now Denies CP, palpitations  PULM: + Cough , Denies wheezing, hemoptysis  GI: Denies hematemesis, hematochezia, melena  : Denies discharge, hematuria  MSK: +myalgias , knee pain and back pain   SKIN: Denies rash, lesions  NEURO: +headache ,+ foggy brain, Denies paresthesias, weakness  PSYCH: Denies depression, anxiety    VITALS:  T(F): 99.7, Max: 100.4 (06-12-24 @ 10:20)  HR: 89  BP: 147/77  RR: 18Vital Signs Last 24 Hrs  T(C): 37.6 (13 Jun 2024 04:09), Max: 38 (12 Jun 2024 10:20)  T(F): 99.7 (13 Jun 2024 04:09), Max: 100.4 (12 Jun 2024 10:20)  HR: 89 (13 Jun 2024 04:09) (85 - 97)  BP: 147/77 (13 Jun 2024 04:09) (125/65 - 147/77)  BP(mean): --  RR: 18 (13 Jun 2024 04:09) (18 - 18)  SpO2: 95% (13 Jun 2024 04:09) (95% - 98%)    Parameters below as of 12 Jun 2024 20:20  Patient On (Oxygen Delivery Method): room air        PHYSICAL EXAM:  Gen: NAD, resting in bed  HEENT: Normocephalic, atraumatic  Neck: supple, no lymphadenopathy  CV: Regular rate & regular rhythm  Lungs: CTAB  Abdomen: Soft, BS present  Ext: Warm, well perfused , Trace edema   Neuro: non focal, awake  Skin: no rash, no lesions  Lines: no phlebitis    TESTS & MEASUREMENTS:                        12.4   10.83 )-----------( 305      ( 13 Jun 2024 07:25 )             37.4     06-12    139  |  101  |  11  ----------------------------<  120<H>  4.5   |  26  |  1.0    Ca    8.5      12 Jun 2024 05:21  Mg     2.3     06-12    TPro  6.0  /  Alb  3.6  /  TBili  0.4  /  DBili  x   /  AST  41  /  ALT  59<H>  /  AlkPhos  131<H>  06-12      LIVER FUNCTIONS - ( 12 Jun 2024 05:21 )  Alb: 3.6 g/dL / Pro: 6.0 g/dL / ALK PHOS: 131 U/L / ALT: 59 U/L / AST: 41 U/L / GGT: x           Urinalysis Basic - ( 12 Jun 2024 05:21 )    Color: x / Appearance: x / SG: x / pH: x  Gluc: 120 mg/dL / Ketone: x  / Bili: x / Urobili: x   Blood: x / Protein: x / Nitrite: x   Leuk Esterase: x / RBC: x / WBC x   Sq Epi: x / Non Sq Epi: x / Bacteria: x        Urinalysis with Rflx Culture (collected 06-11-24 @ 22:49)    Culture - Blood (collected 06-11-24 @ 22:12)  Source: .Blood Blood  Preliminary Report (06-13-24 @ 09:02):    No growth at 24 hours    Culture - Blood (collected 06-11-24 @ 22:12)  Source: .Blood Blood  Preliminary Report (06-13-24 @ 09:02):    No growth at 24 hours        Blood Gas Venous - Lactate: 1.2 mmol/L (06-11-24 @ 22:21)      INFECTIOUS DISEASES TESTING      RADIOLOGY & ADDITIONAL TESTS:  I have personally reviewed the last available Chest xray  CXR  Xray Chest 2 Views PA/Lat:   ACC: [id number] EXAM:  XR CHEST PA LAT 2V   ORDERED BY: JENNIFER BOLDEN     PROCEDURE DATE:  06/11/2024          INTERPRETATION:  Clinical History / Reason for exam: Chest pain    Comparison : Chest radiograph None.    Technique/Positioning: PA and lateral.    Findings:    Support devices: None.    Cardiac/mediastinum/hilum: Unremarkable.    Lung parenchyma/Pleura: Within normal limits.    Skeleton/soft tissues: Unremarkable.    Impression:    No radiographic evidence of acute cardiopulmonary disease.        --- End of Report ---            ELAINE TRENT MD; Attending Radiologist  This document has been electronically signed. Jun 12 2024  2:14PM (06-11-24 @ 22:37)      CT  CT Angio Chest PE Protocol w/ IV Cont:   ACC: [id number] EXAM:  CT ANGIO CHEST PULM ART WAWIC   ORDERED BY: JENNIFER BOLDEN     PROCEDURE DATE:  06/11/2024          INTERPRETATION:  CLINICAL STATEMENT: Chest pain for 4 days. Request made   to evaluate for pulmonary embolism.    TECHNIQUE: Multislice helical sections were obtained from the thoracic   inlet to the lung bases during rapid administration of intravenous   contrast using a CTA protocol. 65 cc administered of Omnipaque 350 (35 cc   discarded). Thin sections were reconstructed through the pulmonary   vasculature. Sagittal and coronal reformatted images were acquired, as   well as MIP reconstructed images.    COMPARISON CT: None.    OTHER STUDIES USED FOR CORRELATION: None.      FINDINGS:    PULMONARY EMBOLUS: No evidence of acute pulmonary embolism.    LUNGS/PLEURA/AIRWAYS: No acute lobar consolidation. No pleural effusion   or pneumothorax.    HEART/VASCULAR: Minimal vascular calcifications. Normal heart size..    MEDIASTINUM/THORACIC NODES: No definite lymphadenopathy.    VISUALIZED UPPER ABDOMEN: Cholelithiasis.    BONES/SOFT TISSUES: Degenerative changes along the vertebral column.      IMPRESSION: No evidence of acute pulmonary embolus    --- End of Report ---            ANTHONY CONTRERAS MD; Attending Radiologist  This document has been electronically signed. Jun 12 2024 12:17AM (06-11-24 @ 23:46)      CARDIOLOGY TESTING  12 Lead ECG:   Ventricular Rate 104 BPM    Atrial Rate 104 BPM    P-R Interval 166 ms    QRS Duration 68 ms    Q-T Interval 320 ms    QTC Calculation(Bazett) 420 ms    P Axis 51 degrees    R Axis 41 degrees    T Axis 28 degrees    Diagnosis Line Sinus tachycardiawith frequent Premature ventricular complexes  Otherwise normal ECG    Confirmed by ZONIA GLASER MD ([id number]) on 6/12/2024 6:53:00 AM (06-11-24 @ 20:38)      All available historical records have been reviewed    MEDICATIONS  cefepime   IVPB 2000  dextrose 10% Bolus 125  dextrose 5%. 1000  dextrose 5%. 1000  dextrose 50% Injectable 25  dextrose 50% Injectable 12.5  enoxaparin Injectable 40  glucagon  Injectable 1  insulin lispro (ADMELOG) corrective regimen sliding scale   insulin lispro (ADMELOG) corrective regimen sliding scale   levothyroxine 150  lisinopril 40  simvastatin 20  vancomycin  IVPB 1250      ANTIBIOTICS:  cefepime   IVPB 2000 milliGRAM(s) IV Intermittent every 12 hours  vancomycin  IVPB 1250 milliGRAM(s) IV Intermittent every 12 hours      All available historical data has been reviewed   ROX SUAREZ  63y, Male  Allergy: Gluten (Stomach Upset)  Drug Allergies Not Recorded    CHIEF COMPLAINT: fever, URI symptoms (13 Jun 2024 05:09)    HPI:  HPI:  62 y/o man w PMHx of HTN, HLD, DM2, hypothyroidism, COVID 2/2024, had onset of fever and cough 5 days prior to admission, went to urgent care 6/8/24 and had flu/COVID negative, presented to ED for continued fevers and found to have fever 102.9, CTA PE protocol negative for PE, no new consolidations, given Vanc Cefepime, ECG notable for PVCs, admitted to telemetry for further evaluation.   HCP wife Rufina Suarez [phone]    At onset, pt noted fever and some diffuse aches/back pain. Also had dry cough, no sensation of chest congestion. CP is described as 6 or 7/10 at worst, localized to L side, not reproducible, not positionally worsened. On ROS reports diffuse headache, brain fog, loss of appetite. Denies rhinorrhea, nasal congestion.   Denies any recent known sick contacts, though mother is in nursing home. Lives w wife at home, no children, no pets. Has small yard, but denies any insect bites. No mold noted at home. No recent travel, changes in lifestyle. No urinary symptoms. No diarrhea. No recent cocaine/drug use. Nonsmoker.    Triage vitals were: 175/69, 103bpm, RR18, 96% on RA, 99.4   Repeat vitals notable for: tmax 102.9   Labs notable for: WBC 9.6, lactate 1.2, trop 22   Imaging: CTA PE protocol negative for PE, no new consolidations. Hepatic steatosis.   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, few other places   , lives w wife (12 Jun 2024 06:30)      Recent Travel:  Other Exposures:     ROS  General: Denies rigors, occasional night sweats  HEENT: headache, Denies rhinorrhea, sore throat, eye pain  CV: now Denies CP, palpitations  PULM: + Cough , Denies wheezing, hemoptysis  GI: Denies hematemesis, hematochezia, melena  : Denies discharge, hematuria  MSK: +myalgias , knee pain and back pain   SKIN: Denies rash, lesions  NEURO: +headache ,+ foggy brain, Denies paresthesias, weakness  PSYCH: Denies depression, anxiety    VITALS:  T(F): 99.7, Max: 100.4 (06-12-24 @ 10:20)  HR: 89  BP: 147/77  RR: 18Vital Signs Last 24 Hrs  T(C): 37.6 (13 Jun 2024 04:09), Max: 38 (12 Jun 2024 10:20)  T(F): 99.7 (13 Jun 2024 04:09), Max: 100.4 (12 Jun 2024 10:20)  HR: 89 (13 Jun 2024 04:09) (85 - 97)  BP: 147/77 (13 Jun 2024 04:09) (125/65 - 147/77)  BP(mean): --  RR: 18 (13 Jun 2024 04:09) (18 - 18)  SpO2: 95% (13 Jun 2024 04:09) (95% - 98%)    Parameters below as of 12 Jun 2024 20:20  Patient On (Oxygen Delivery Method): room air        PHYSICAL EXAM:  Gen: NAD, resting in bed  HEENT: Normocephalic, atraumatic  Neck: supple, no lymphadenopathy  CV: Regular rate & regular rhythm  Lungs: CTAB  Abdomen: Soft, BS present  Ext: Warm, well perfused , Trace edema   Neuro: non focal, awake  Skin: no rash, no lesions  Lines: no phlebitis    TESTS & MEASUREMENTS:                        12.4   10.83 )-----------( 305      ( 13 Jun 2024 07:25 )             37.4     06-12    139  |  101  |  11  ----------------------------<  120<H>  4.5   |  26  |  1.0    Ca    8.5      12 Jun 2024 05:21  Mg     2.3     06-12    TPro  6.0  /  Alb  3.6  /  TBili  0.4  /  DBili  x   /  AST  41  /  ALT  59<H>  /  AlkPhos  131<H>  06-12      LIVER FUNCTIONS - ( 12 Jun 2024 05:21 )  Alb: 3.6 g/dL / Pro: 6.0 g/dL / ALK PHOS: 131 U/L / ALT: 59 U/L / AST: 41 U/L / GGT: x           Urinalysis Basic - ( 12 Jun 2024 05:21 )    Color: x / Appearance: x / SG: x / pH: x  Gluc: 120 mg/dL / Ketone: x  / Bili: x / Urobili: x   Blood: x / Protein: x / Nitrite: x   Leuk Esterase: x / RBC: x / WBC x   Sq Epi: x / Non Sq Epi: x / Bacteria: x        Urinalysis with Rflx Culture (collected 06-11-24 @ 22:49)    Culture - Blood (collected 06-11-24 @ 22:12)  Source: .Blood Blood  Preliminary Report (06-13-24 @ 09:02):    No growth at 24 hours    Culture - Blood (collected 06-11-24 @ 22:12)  Source: .Blood Blood  Preliminary Report (06-13-24 @ 09:02):    No growth at 24 hours        Blood Gas Venous - Lactate: 1.2 mmol/L (06-11-24 @ 22:21)      INFECTIOUS DISEASES TESTING      RADIOLOGY & ADDITIONAL TESTS:  I have personally reviewed the last available Chest xray  CXR  Xray Chest 2 Views PA/Lat:   ACC: [id number] EXAM:  XR CHEST PA LAT 2V   ORDERED BY: JENNIFER BOLDEN     PROCEDURE DATE:  06/11/2024          INTERPRETATION:  Clinical History / Reason for exam: Chest pain    Comparison : Chest radiograph None.    Technique/Positioning: PA and lateral.    Findings:    Support devices: None.    Cardiac/mediastinum/hilum: Unremarkable.    Lung parenchyma/Pleura: Within normal limits.    Skeleton/soft tissues: Unremarkable.    Impression:    No radiographic evidence of acute cardiopulmonary disease.        --- End of Report ---            ELAINE TRENT MD; Attending Radiologist  This document has been electronically signed. Jun 12 2024  2:14PM (06-11-24 @ 22:37)      CT  CT Angio Chest PE Protocol w/ IV Cont:   ACC: [id number] EXAM:  CT ANGIO CHEST PULM ART WAWIC   ORDERED BY: JENNIFER BOLDEN     PROCEDURE DATE:  06/11/2024          INTERPRETATION:  CLINICAL STATEMENT: Chest pain for 4 days. Request made   to evaluate for pulmonary embolism.    TECHNIQUE: Multislice helical sections were obtained from the thoracic   inlet to the lung bases during rapid administration of intravenous   contrast using a CTA protocol. 65 cc administered of Omnipaque 350 (35 cc   discarded). Thin sections were reconstructed through the pulmonary   vasculature. Sagittal and coronal reformatted images were acquired, as   well as MIP reconstructed images.    COMPARISON CT: None.    OTHER STUDIES USED FOR CORRELATION: None.      FINDINGS:    PULMONARY EMBOLUS: No evidence of acute pulmonary embolism.    LUNGS/PLEURA/AIRWAYS: No acute lobar consolidation. No pleural effusion   or pneumothorax.    HEART/VASCULAR: Minimal vascular calcifications. Normal heart size..    MEDIASTINUM/THORACIC NODES: No definite lymphadenopathy.    VISUALIZED UPPER ABDOMEN: Cholelithiasis.    BONES/SOFT TISSUES: Degenerative changes along the vertebral column.      IMPRESSION: No evidence of acute pulmonary embolus    --- End of Report ---            ANTHONY CONTRERAS MD; Attending Radiologist  This document has been electronically signed. Jun 12 2024 12:17AM (06-11-24 @ 23:46)      CARDIOLOGY TESTING  12 Lead ECG:   Ventricular Rate 104 BPM    Atrial Rate 104 BPM    P-R Interval 166 ms    QRS Duration 68 ms    Q-T Interval 320 ms    QTC Calculation(Bazett) 420 ms    P Axis 51 degrees    R Axis 41 degrees    T Axis 28 degrees    Diagnosis Line Sinus tachycardiawith frequent Premature ventricular complexes  Otherwise normal ECG    Confirmed by ZONIA GLASER MD ([id number]) on 6/12/2024 6:53:00 AM (06-11-24 @ 20:38)      All available historical records have been reviewed    MEDICATIONS  cefepime   IVPB 2000  dextrose 10% Bolus 125  dextrose 5%. 1000  dextrose 5%. 1000  dextrose 50% Injectable 25  dextrose 50% Injectable 12.5  enoxaparin Injectable 40  glucagon  Injectable 1  insulin lispro (ADMELOG) corrective regimen sliding scale   insulin lispro (ADMELOG) corrective regimen sliding scale   levothyroxine 150  lisinopril 40  simvastatin 20  vancomycin  IVPB 1250      ANTIBIOTICS:  cefepime   IVPB 2000 milliGRAM(s) IV Intermittent every 12 hours  vancomycin  IVPB 1250 milliGRAM(s) IV Intermittent every 12 hours      All available historical data has been reviewed

## 2024-06-14 ENCOUNTER — TRANSCRIPTION ENCOUNTER (OUTPATIENT)
Age: 64
End: 2024-06-14

## 2024-06-14 VITALS
HEART RATE: 64 BPM | DIASTOLIC BLOOD PRESSURE: 75 MMHG | RESPIRATION RATE: 18 BRPM | TEMPERATURE: 98 F | OXYGEN SATURATION: 99 % | SYSTOLIC BLOOD PRESSURE: 131 MMHG

## 2024-06-14 LAB
ALBUMIN SERPL ELPH-MCNC: 4.1 G/DL — SIGNIFICANT CHANGE UP (ref 3.5–5.2)
ALP SERPL-CCNC: 131 U/L — HIGH (ref 30–115)
ALT FLD-CCNC: 52 U/L — HIGH (ref 0–41)
ANION GAP SERPL CALC-SCNC: 11 MMOL/L — SIGNIFICANT CHANGE UP (ref 7–14)
AST SERPL-CCNC: 22 U/L — SIGNIFICANT CHANGE UP (ref 0–41)
BILIRUB SERPL-MCNC: 0.3 MG/DL — SIGNIFICANT CHANGE UP (ref 0.2–1.2)
BUN SERPL-MCNC: 10 MG/DL — SIGNIFICANT CHANGE UP (ref 10–20)
CALCIUM SERPL-MCNC: 9.3 MG/DL — SIGNIFICANT CHANGE UP (ref 8.4–10.5)
CHLORIDE SERPL-SCNC: 101 MMOL/L — SIGNIFICANT CHANGE UP (ref 98–110)
CO2 SERPL-SCNC: 28 MMOL/L — SIGNIFICANT CHANGE UP (ref 17–32)
CREAT SERPL-MCNC: 1 MG/DL — SIGNIFICANT CHANGE UP (ref 0.7–1.5)
EGFR: 84 ML/MIN/1.73M2 — SIGNIFICANT CHANGE UP
GLUCOSE BLDC GLUCOMTR-MCNC: 114 MG/DL — HIGH (ref 70–99)
GLUCOSE SERPL-MCNC: 123 MG/DL — HIGH (ref 70–99)
INR BLD: 1.17 RATIO — SIGNIFICANT CHANGE UP (ref 0.65–1.3)
MELD SCORE WITH DIALYSIS: 21 POINTS — SIGNIFICANT CHANGE UP
MELD SCORE WITHOUT DIALYSIS: 8 POINTS — SIGNIFICANT CHANGE UP
POTASSIUM SERPL-MCNC: 4.5 MMOL/L — SIGNIFICANT CHANGE UP (ref 3.5–5)
POTASSIUM SERPL-SCNC: 4.5 MMOL/L — SIGNIFICANT CHANGE UP (ref 3.5–5)
PROT SERPL-MCNC: 7.3 G/DL — SIGNIFICANT CHANGE UP (ref 6–8)
PROTHROM AB SERPL-ACNC: 13.4 SEC — HIGH (ref 9.95–12.87)
SODIUM SERPL-SCNC: 140 MMOL/L — SIGNIFICANT CHANGE UP (ref 135–146)

## 2024-06-14 PROCEDURE — 93010 ELECTROCARDIOGRAM REPORT: CPT

## 2024-06-14 PROCEDURE — 99238 HOSP IP/OBS DSCHRG MGMT 30/<: CPT | Mod: GC

## 2024-06-14 RX ADMIN — ENOXAPARIN SODIUM 40 MILLIGRAM(S): 100 INJECTION SUBCUTANEOUS at 05:56

## 2024-06-14 RX ADMIN — Medication 150 MICROGRAM(S): at 06:39

## 2024-06-14 RX ADMIN — LISINOPRIL 40 MILLIGRAM(S): 2.5 TABLET ORAL at 06:45

## 2024-06-14 RX ADMIN — Medication 100 MILLIGRAM(S): at 05:56

## 2024-06-14 NOTE — DISCHARGE NOTE PROVIDER - NSDCFUSCHEDAPPT_GEN_ALL_CORE_FT
Svitlana Sal  [facility]  UROLOGY [address] [facility]  Scheduled Appointment: 06/19/2024    Juliana Vargas  [facility]  NEUROLOGY [address] [facility]  Scheduled Appointment: 07/31/2024

## 2024-06-14 NOTE — DISCHARGE NOTE PROVIDER - CARE PROVIDER_API CALL
Halina Carnes  Infectious Disease  [address] [place] [address]  Phone: [phone]  Fax: [phone]  Follow Up Time: 1 month

## 2024-06-14 NOTE — DISCHARGE NOTE PROVIDER - NSDCCPCAREPLAN_GEN_ALL_CORE_FT
PRINCIPAL DISCHARGE DIAGNOSIS  Diagnosis: Lyme disease  Assessment and Plan of Treatment: You came in with chest pain, fever. You were found to have Lyme disease.   Lyme disease is a bacterial infection spread by ticks.  Antibiotics can treat Lyme disease. If you do not treat Lyme disease, it can lead to problems with your skin, joints, heart, and nervous system. These problems can develop weeks, months, or even years after you get the infection.  Your doctor may prescribe antibiotics even if it is not yet certain that you have Lyme disease.  Follow-up care is a key part of your treatment and safety.  Please follow up with Dr. Carnes via Outline on tuesdays and repeat Lyme tests in 4 weeks.   Call 911 or seek immediate medical care if:  You are confused or cannot think clearly.  You have a headache or stiff neck.  You have a new or worse rash.  You have symptoms of infection, such as:  Increased pain, swelling, warmth, or redness.  Red streaks leading from the area.  Pus draining from the area.  A fever.      SECONDARY DISCHARGE DIAGNOSES  Diagnosis: Chest pain  Assessment and Plan of Treatment:

## 2024-06-14 NOTE — DISCHARGE NOTE PROVIDER - NSDCMRMEDTOKEN_GEN_ALL_CORE_FT
doxycycline monohydrate 50 mg oral capsule: 2 cap(s) orally every 12 hours End date 6/27/24  lisinopril 40 mg oral tablet: 1 tab(s) orally once a day  metFORMIN 500 mg oral tablet: 1 tab(s) orally 2 times a day  simvastatin 20 mg oral tablet: 1 tab(s) orally once a day (at bedtime)  Synthroid 150 mcg (0.15 mg) oral tablet: 1 tab(s) orally once a day  testosterone 20.25 mg/1.25 g (1.62%) transdermal gel: 1 packet(s) transdermally

## 2024-06-14 NOTE — DISCHARGE NOTE NURSING/CASE MANAGEMENT/SOCIAL WORK - NSDCPEFALRISK_GEN_ALL_CORE
For information on Fall & Injury Prevention, visit: [email or web address] OR  [email or web address] OR  https://www.cdc.gov/steadi/patient.html

## 2024-06-14 NOTE — DISCHARGE NOTE PROVIDER - CARE PROVIDERS DIRECT ADDRESSES
[email or web address]

## 2024-06-14 NOTE — DISCHARGE NOTE PROVIDER - HOSPITAL COURSE
62 y/o man w PMHx of HTN, HLD, DM2, hypothyroidism, COVID 2/2024, had onset of fever and cough 5 days prior to admission, went to urgent care 6/8/24 and had flu/COVID negative, presented to ED for continued fevers and found to have fever 102.9, CTA PE protocol negative for PE, no new consolidations, given Vanc Cefepime, ECG notable for PVCs, admitted to telemetry for further evaluation.   HCP wife Rufina Suarez [phone]    At onset, pt noted fever and some diffuse aches/back pain. Also had dry cough, no sensation of chest congestion. CP is described as 6 or 7/10 at worst, localized to L side, not reproducible, not positionally worsened. On ROS reports diffuse headache, brain fog, loss of appetite. Denies rhinorrhea, nasal congestion.   Denies any recent known sick contacts, though mother is in nursing home. Lives w wife at home, no children, no pets. Has small yard, but denies any insect bites. No mold noted at home. No recent travel, changes in lifestyle. No urinary symptoms. No diarrhea. No recent cocaine/drug use. Nonsmoker.    Triage vitals were: 175/69, 103bpm, RR18, 96% on RA, 99.4   Repeat vitals notable for: tmax 102.9   Labs notable for: WBC 9.6, lactate 1.2, trop 22   Imaging: CTA PE protocol negative for PE, no new consolidations. Hepatic steatosis.   Pt received Vanc + Cefepime in ED. His lyme immunological tests were positive. Patient was monitored on telemetry unit for 48 hours without any events on tele. Patient was seen by ID. Pt is to complete Doxycycline course. He is clinically stable to be discharged at this time. Patient is to follow up with his PCP, and ID upon discharge.

## 2024-06-14 NOTE — DISCHARGE NOTE NURSING/CASE MANAGEMENT/SOCIAL WORK - PATIENT PORTAL LINK FT
You can access the FollowMyHealth Patient Portal offered by [facility] by registering at the following website: [email or web address]. By joining [employer]’s FollowMyHealth portal, you will also be able to view your health information using other applications (apps) compatible with our system.

## 2024-06-14 NOTE — DISCHARGE NOTE PROVIDER - ATTENDING DISCHARGE PHYSICAL EXAMINATION:
Vital Signs Last 24 Hrs  T(C): 36.6 (14 Jun 2024 13:11), Max: 36.8 (13 Jun 2024 20:10)  T(F): 97.9 (14 Jun 2024 13:11), Max: 98.3 (13 Jun 2024 20:10)  HR: 64 (14 Jun 2024 13:11) (59 - 76)  BP: 131/75 (14 Jun 2024 13:11) (131/75 - 149/74)  RR: 12  SpO2: 99% (14 Jun 2024 13:11) (96% - 99%)    Parameters below as of 13 Jun 2024 21:00  Patient On (Oxygen Delivery Method): room air    GENERAL: NAD  HEAD:  Atraumatic, Normocephalic  EYES: EOMI, PERRL, conjunctiva and sclera clear  ENMT: MMM, no angular cheilitis appreciated  NECK: Supple, trachea midline  Lung: normal work of breathing, cta b/l  Cardiovascular: S1&S2+, rrr, no m/r/g appreciated  ABDOMEN: soft, nt  : No enriquez catheter, no suprapubic pain to palpation  Neuro: Alert & follows commands, no flaccid paralysis in extremities appreciated  SKIN: warm and dry, no visible purulence in exposed areas

## 2024-06-17 LAB
CULTURE RESULTS: SIGNIFICANT CHANGE UP
CULTURE RESULTS: SIGNIFICANT CHANGE UP
SPECIMEN SOURCE: SIGNIFICANT CHANGE UP
SPECIMEN SOURCE: SIGNIFICANT CHANGE UP
WNV RNA SPEC QL NAA+PROBE: SIGNIFICANT CHANGE UP
WNV RNA SPEC QL NAA+PROBE: SIGNIFICANT CHANGE UP

## 2024-06-18 LAB
WNV IGG TITR FLD: NEGATIVE — SIGNIFICANT CHANGE UP
WNV IGM SPEC QL: NEGATIVE — SIGNIFICANT CHANGE UP

## 2024-06-19 ENCOUNTER — APPOINTMENT (OUTPATIENT)
Dept: UROLOGY | Facility: CLINIC | Age: 64
End: 2024-06-19
Payer: COMMERCIAL

## 2024-06-19 VITALS
SYSTOLIC BLOOD PRESSURE: 152 MMHG | BODY MASS INDEX: 34.69 KG/M2 | WEIGHT: 221 LBS | DIASTOLIC BLOOD PRESSURE: 72 MMHG | TEMPERATURE: 97.5 F | HEIGHT: 67 IN | HEART RATE: 74 BPM

## 2024-06-19 DIAGNOSIS — E29.1 TESTICULAR HYPOFUNCTION: ICD-10-CM

## 2024-06-19 PROCEDURE — G2211 COMPLEX E/M VISIT ADD ON: CPT

## 2024-06-19 PROCEDURE — 99214 OFFICE O/P EST MOD 30 MIN: CPT

## 2024-06-19 NOTE — HISTORY OF PRESENT ILLNESS
[Currently Experiencing ___] :  [unfilled] [Erectile Dysfunction] : Erectile Dysfunction [Fatigue] : fatigue [None] : None [FreeTextEntry1] : Chacho is a 63-year-old male born November 18, 1960 last seen  04/11/2024  His Dermatophytosis has recurred and it responds to econazole.  He has low testosterone being treated with 2 pumps per day of AndroGel he had bloods done on 05/18/2024 and is here for review and refill  Finally, with the testosterone on board and with him using sildenafil at the 100 mg dose his erection is working but they only try infrequently.  He tells me there has been no changes in his medical conditions and in general he feels good  Blood test were done May 18, 2024 Total testosterone was 351 Free testosterone was 82.4 Bioavailable testosterone was 1058.7 Sex hormone binding globulin was only 15 explaining the relatively lower total testosterone with albumin of 4.2 Estradiol was 36 CBC and LFTs were not done Both values were done in the hospital when he was sick with Lyme disease he was anemic and he will follow-up with his PCP for that [Urinary Incontinence] : no urinary incontinence [Urinary Retention] : no urinary retention [Urinary Urgency] : no urinary urgency [Urinary Frequency] : no urinary frequency [Nocturia] : no nocturia [Straining] : no straining [Weak Stream] : no weak stream [Intermittency] : no intermittency [Post-Void Dribbling] : no post-void dribbling [Dysuria] : no dysuria [Hematuria - Gross] : no gross hematuria

## 2024-06-19 NOTE — LETTER HEADER
[FreeTextEntry3] : Svitlana Sal MD [employer] [facility], [address] [place] [address]

## 2024-06-19 NOTE — PHYSICAL EXAM
[General Appearance - Well Developed] : well developed [General Appearance - Well Nourished] : well nourished [Normal Appearance] : normal appearance [Well Groomed] : well groomed [General Appearance - In No Acute Distress] : no acute distress [Heart Rate And Rhythm] : heart rate and rhythm were normal [Respiration, Rhythm And Depth] : normal respiratory rhythm and effort [Exaggerated Use Of Accessory Muscles For Inspiration] : no accessory muscle use [Auscultation Breath Sounds / Voice Sounds] : lungs were clear to auscultation bilaterally [Abdomen Soft] : soft [Abdomen Tenderness] : non-tender [Costovertebral Angle Tenderness] : no ~M costovertebral angle tenderness [Normal Station and Gait] : the gait and station were normal for the patient's age [] : no rash [No Focal Deficits] : no focal deficits [Oriented To Time, Place, And Person] : oriented to person, place, and time [Affect] : the affect was normal [Mood] : the mood was normal [Not Anxious] : not anxious [No Palpable Adenopathy] : no palpable adenopathy [FreeTextEntry1] : 34.6 [de-identified] : mild edema [de-identified] : Fields of View were normal

## 2024-06-19 NOTE — LETTER BODY
[Dear  ___] : Dear  [unfilled], [Courtesy Letter:] : I had the pleasure of seeing your patient, [unfilled], in my office today. [Please see my note below.] : Please see my note below. [Sincerely,] : Sincerely, [FreeTextEntry2] : Delphine Garcia MD [address], [place] [id number]

## 2024-06-19 NOTE — ASSESSMENT
[FreeTextEntry1] : His numbers are okay he is feeling okay I will renew the medication at 2 pumps per day and we will see him again with repeat bloods in 3 months.  For now he has enough medication he will contact us when he is down to a 2-week supply

## 2024-06-20 PROBLEM — I10 ESSENTIAL (PRIMARY) HYPERTENSION: Chronic | Status: ACTIVE | Noted: 2024-06-12

## 2024-06-20 PROBLEM — E03.9 HYPOTHYROIDISM, UNSPECIFIED: Chronic | Status: ACTIVE | Noted: 2024-06-12

## 2024-06-20 PROBLEM — K76.0 FATTY (CHANGE OF) LIVER, NOT ELSEWHERE CLASSIFIED: Chronic | Status: ACTIVE | Noted: 2024-06-12

## 2024-06-20 PROBLEM — E78.5 HYPERLIPIDEMIA, UNSPECIFIED: Chronic | Status: ACTIVE | Noted: 2024-06-12

## 2024-06-21 DIAGNOSIS — E03.9 HYPOTHYROIDISM, UNSPECIFIED: ICD-10-CM

## 2024-06-21 DIAGNOSIS — Z79.890 HORMONE REPLACEMENT THERAPY: ICD-10-CM

## 2024-06-21 DIAGNOSIS — D84.9 IMMUNODEFICIENCY, UNSPECIFIED: ICD-10-CM

## 2024-06-21 DIAGNOSIS — E78.5 HYPERLIPIDEMIA, UNSPECIFIED: ICD-10-CM

## 2024-06-21 DIAGNOSIS — R74.01 ELEVATION OF LEVELS OF LIVER TRANSAMINASE LEVELS: ICD-10-CM

## 2024-06-21 DIAGNOSIS — Z79.84 LONG TERM (CURRENT) USE OF ORAL HYPOGLYCEMIC DRUGS: ICD-10-CM

## 2024-06-21 DIAGNOSIS — I10 ESSENTIAL (PRIMARY) HYPERTENSION: ICD-10-CM

## 2024-06-21 DIAGNOSIS — R07.9 CHEST PAIN, UNSPECIFIED: ICD-10-CM

## 2024-06-21 DIAGNOSIS — E11.9 TYPE 2 DIABETES MELLITUS WITHOUT COMPLICATIONS: ICD-10-CM

## 2024-06-21 DIAGNOSIS — A69.20 LYME DISEASE, UNSPECIFIED: ICD-10-CM

## 2024-06-21 DIAGNOSIS — K76.0 FATTY (CHANGE OF) LIVER, NOT ELSEWHERE CLASSIFIED: ICD-10-CM

## 2024-06-21 DIAGNOSIS — Z87.891 PERSONAL HISTORY OF NICOTINE DEPENDENCE: ICD-10-CM

## 2024-06-21 DIAGNOSIS — Z86.16 PERSONAL HISTORY OF COVID-19: ICD-10-CM

## 2024-07-31 ENCOUNTER — APPOINTMENT (OUTPATIENT)
Dept: NEUROLOGY | Facility: CLINIC | Age: 64
End: 2024-07-31

## 2024-09-16 ENCOUNTER — APPOINTMENT (OUTPATIENT)
Dept: UROLOGY | Facility: CLINIC | Age: 64
End: 2024-09-16
Payer: COMMERCIAL

## 2024-09-16 VITALS
SYSTOLIC BLOOD PRESSURE: 150 MMHG | WEIGHT: 228 LBS | HEIGHT: 67 IN | BODY MASS INDEX: 35.79 KG/M2 | DIASTOLIC BLOOD PRESSURE: 76 MMHG | TEMPERATURE: 98 F

## 2024-09-16 DIAGNOSIS — E66.09 OTHER OBESITY DUE TO EXCESS CALORIES: ICD-10-CM

## 2024-09-16 DIAGNOSIS — B35.6 TINEA CRURIS: ICD-10-CM

## 2024-09-16 DIAGNOSIS — R79.89 OTHER SPECIFIED ABNORMAL FINDINGS OF BLOOD CHEMISTRY: ICD-10-CM

## 2024-09-16 DIAGNOSIS — E29.1 TESTICULAR HYPOFUNCTION: ICD-10-CM

## 2024-09-16 PROCEDURE — 99214 OFFICE O/P EST MOD 30 MIN: CPT

## 2024-09-16 PROCEDURE — G2211 COMPLEX E/M VISIT ADD ON: CPT

## 2024-09-16 NOTE — ASSESSMENT
[FreeTextEntry1] : His numbers are okay he put on a little weight but he was on vacation he will go back on watching his weight we will stay on the medication get blood and see me in 3 months  For now he has enough medication he will contact me when he is running low

## 2024-09-16 NOTE — HISTORY OF PRESENT ILLNESS
[FreeTextEntry1] : Chacho is a 63-year-old male born November 18, 1960 last seen  06/19/2024  His Dermatophytosis has recurred, and it responds to econazole and he added a medicated powder-he will make sure it is not talc.  He has low testosterone being treated with 2 pumps per day of AndroGel he had bloods done on 08/31/2024 and is here for review and refill  Finally, with the testosterone on board and with him using sildenafil at the 100 mg dose his erection is working but they only try infrequently.  He tells me there has been no changes in his medical conditions and in general he feels good  Blood test were done August 31, 2024 Total testosterone was 324 Free testosterone was 89.9 Bioavailable testosterone was 173 Estradiol was 36 Sex hormone binding globulin 11 explains a relative lowered total testosterone Albumin was 4.2 Liver function tests were normal Hemoglobin was 14.2 Platelet count was just over normal at 403,000 PSA was 1.17 [Currently Experiencing ___] :  [unfilled] [Urinary Incontinence] : no urinary incontinence [Urinary Retention] : no urinary retention [Urinary Urgency] : no urinary urgency [Urinary Frequency] : no urinary frequency [Nocturia] : no nocturia [Straining] : no straining [Weak Stream] : no weak stream [Intermittency] : no intermittency [Post-Void Dribbling] : no post-void dribbling [Erectile Dysfunction] : Erectile Dysfunction [Dysuria] : no dysuria [Hematuria - Gross] : no gross hematuria [Fatigue] : fatigue [None] : None

## 2024-09-16 NOTE — LETTER BODY
[Dear  ___] : Dear  [unfilled], [Courtesy Letter:] : I had the pleasure of seeing your patient, [unfilled], in my office today. [Please see my note below.] : Please see my note below. [Sincerely,] : Sincerely, [FreeTextEntry2] : Iron Yadav MD [address] [place] [address]

## 2024-09-16 NOTE — PHYSICAL EXAM
[General Appearance - Well Developed] : well developed [General Appearance - Well Nourished] : well nourished [Normal Appearance] : normal appearance [Well Groomed] : well groomed [General Appearance - In No Acute Distress] : no acute distress [Heart Rate And Rhythm] : heart rate and rhythm were normal [Respiration, Rhythm And Depth] : normal respiratory rhythm and effort [Exaggerated Use Of Accessory Muscles For Inspiration] : no accessory muscle use [Auscultation Breath Sounds / Voice Sounds] : lungs were clear to auscultation bilaterally [Abdomen Soft] : soft [Abdomen Tenderness] : non-tender [Costovertebral Angle Tenderness] : no ~M costovertebral angle tenderness [Normal Station and Gait] : the gait and station were normal for the patient's age [] : no rash [No Focal Deficits] : no focal deficits [Oriented To Time, Place, And Person] : oriented to person, place, and time [Affect] : the affect was normal [Mood] : the mood was normal [Not Anxious] : not anxious [FreeTextEntry1] : 35.7 [de-identified] : mild edema [de-identified] : Fields of View were normal  [Chaperone Declined] : Patient declined chaperone

## 2024-09-16 NOTE — LETTER HEADER
[FreeTextEntry3] : Svitlana Sal MD [facility], [address] [place] [address]

## 2024-12-16 ENCOUNTER — APPOINTMENT (OUTPATIENT)
Dept: UROLOGY | Facility: CLINIC | Age: 64
End: 2024-12-16
Payer: COMMERCIAL

## 2024-12-16 VITALS
SYSTOLIC BLOOD PRESSURE: 158 MMHG | WEIGHT: 233 LBS | BODY MASS INDEX: 36.57 KG/M2 | OXYGEN SATURATION: 96 % | RESPIRATION RATE: 18 BRPM | HEIGHT: 67 IN | DIASTOLIC BLOOD PRESSURE: 81 MMHG | HEART RATE: 63 BPM

## 2024-12-16 DIAGNOSIS — E28.0 ESTROGEN EXCESS: ICD-10-CM

## 2024-12-16 DIAGNOSIS — E29.1 TESTICULAR HYPOFUNCTION: ICD-10-CM

## 2024-12-16 PROCEDURE — 99214 OFFICE O/P EST MOD 30 MIN: CPT

## 2024-12-16 PROCEDURE — G2211 COMPLEX E/M VISIT ADD ON: CPT

## 2025-02-24 ENCOUNTER — APPOINTMENT (OUTPATIENT)
Dept: UROLOGY | Facility: CLINIC | Age: 65
End: 2025-02-24
Payer: COMMERCIAL

## 2025-02-24 VITALS
WEIGHT: 230 LBS | DIASTOLIC BLOOD PRESSURE: 77 MMHG | SYSTOLIC BLOOD PRESSURE: 155 MMHG | BODY MASS INDEX: 36.1 KG/M2 | HEART RATE: 61 BPM | HEIGHT: 67 IN

## 2025-02-24 DIAGNOSIS — E29.1 TESTICULAR HYPOFUNCTION: ICD-10-CM

## 2025-02-24 DIAGNOSIS — E28.0 ESTROGEN EXCESS: ICD-10-CM

## 2025-02-24 PROCEDURE — 99214 OFFICE O/P EST MOD 30 MIN: CPT

## 2025-03-30 ENCOUNTER — EMERGENCY (EMERGENCY)
Facility: HOSPITAL | Age: 65
LOS: 0 days | Discharge: HOME | End: 2025-03-31
Attending: STUDENT IN AN ORGANIZED HEALTH CARE EDUCATION/TRAINING PROGRAM
Payer: COMMERCIAL

## 2025-03-30 VITALS
HEART RATE: 71 BPM | DIASTOLIC BLOOD PRESSURE: 90 MMHG | HEIGHT: 67 IN | TEMPERATURE: 98 F | OXYGEN SATURATION: 95 % | RESPIRATION RATE: 18 BRPM | SYSTOLIC BLOOD PRESSURE: 190 MMHG | WEIGHT: 229.94 LBS

## 2025-03-30 DIAGNOSIS — I10 ESSENTIAL (PRIMARY) HYPERTENSION: ICD-10-CM

## 2025-03-30 DIAGNOSIS — Z91.018 ALLERGY TO OTHER FOODS: ICD-10-CM

## 2025-03-30 DIAGNOSIS — E03.9 HYPOTHYROIDISM, UNSPECIFIED: ICD-10-CM

## 2025-03-30 DIAGNOSIS — E78.5 HYPERLIPIDEMIA, UNSPECIFIED: ICD-10-CM

## 2025-03-30 DIAGNOSIS — K08.89 OTHER SPECIFIED DISORDERS OF TEETH AND SUPPORTING STRUCTURES: ICD-10-CM

## 2025-03-30 DIAGNOSIS — E11.9 TYPE 2 DIABETES MELLITUS WITHOUT COMPLICATIONS: ICD-10-CM

## 2025-03-30 PROCEDURE — 99283 EMERGENCY DEPT VISIT LOW MDM: CPT
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
## 2025-03-31 RX ORDER — AMOXICILLIN 500 MG/1
500 CAPSULE ORAL ONCE
Refills: 0 | Status: COMPLETED | OUTPATIENT
Start: 2025-03-31 | End: 2025-03-31

## 2025-03-31 RX ORDER — AMOXICILLIN 500 MG/1
1 CAPSULE ORAL
Qty: 21 | Refills: 0
Start: 2025-03-31 | End: 2025-04-06

## 2025-03-31 RX ORDER — IBUPROFEN 200 MG
600 TABLET ORAL ONCE
Refills: 0 | Status: COMPLETED | OUTPATIENT
Start: 2025-03-31 | End: 2025-03-31

## 2025-03-31 RX ADMIN — AMOXICILLIN 500 MILLIGRAM(S): 500 CAPSULE ORAL at 01:01

## 2025-03-31 RX ADMIN — Medication 600 MILLIGRAM(S): at 01:01

## 2025-03-31 NOTE — ED PROVIDER NOTE - NSFOLLOWUPCLINICS_GEN_ALL_ED_FT
[facility]  Dental  [address] [place] [id number]  Phone: [phone]  Fax:   Follow Up Time: 4-6 Days

## 2025-03-31 NOTE — ED PROVIDER NOTE - OBJECTIVE STATEMENT
64 yom with history of HTN, HLD, DM2, hypothyroidism presents due to right lower dental pain for one week, no trauma or fever or chills able to tolerate orally

## 2025-03-31 NOTE — ED PROVIDER NOTE - NSFOLLOWUPINSTRUCTIONS_ED_ALL_ED_FT
Our Emergency Department Referral Coordinators will be reaching out to you in the next 24-48 hours from 9:00am to 5:00pm to schedule a follow up appointment. Please expect a phone call from the hospital in that time frame. If you do not receive a call or if you have any questions or concerns, you can reach them at   [phone].      Dental Abscess  A dental abscess is a collection of pus in or around a tooth that results from an infection. An abscess can cause pain in the affected area as well as other symptoms. Treatment is important to help with symptoms and to prevent the infection from spreading.    What are the causes?  This condition is caused by a bacterial infection around the root of the tooth that involves the inner part of the tooth (pulp). It may result from:    Severe tooth decay.  Trauma to the tooth, such as a broken or chipped tooth, that allows bacteria to enter into the pulp.  Severe gum disease around a tooth.    What increases the risk?  This condition is more likely to develop in males. It is also more likely to develop in people who:    Have dental decay (cavities).  Eat sugary snacks between meals.  Use tobacco products.  Have diabetes.  Have a weakened disease-fighting system (immune system).  Do not brush and care for their teeth regularly.    What are the signs or symptoms?  Symptoms of this condition include:    Severe pain in and around the infected tooth.  Swelling and redness around the infected tooth, in the mouth, or in the face.  Tenderness.  Pus drainage.  Bad breath.  Bitter taste in the mouth.  Difficulty swallowing.  Difficulty opening the mouth.  Nausea.  Vomiting.  Chills.  Swollen neck glands.  Fever.    How is this diagnosed?  This condition is diagnosed based on:    Your symptoms and your medical and dental history.  An examination of the infected tooth. During the exam, your dentist may tap on the infected tooth.    You may also have X-rays of the affected area.    How is this treated?  This condition is treated by getting rid of the infection. This may be done with:    Incision and drainage. This procedure is done by making an incision in the abscess to drain out the pus. Removing pus is the first priority in treating an abscess.  Antibiotic medicines. These may be used in certain situations.  Antibacterial mouth rinse.  A root canal. This may be performed to save the tooth. Your dentist accesses the visible part of your tooth (crown) with a drill and removes any damaged pulp. Then the space is filled and sealed off.  Tooth extraction. The tooth is pulled out if it cannot be saved by other treatment.    You may also receive treatment for pain, such as:    Acetaminophen or NSAIDs.  Gels that contain a numbing medicine.  An injection to block the pain near your nerve.    Follow these instructions at home:  Medicines     Take over-the-counter and prescription medicines only as told by your dentist.  If you were prescribed an antibiotic, take it as told by your dentist. Do not stop taking the antibiotic even if you start to feel better.  If you were prescribed a gel that contains a numbing medicine, use it exactly as told in the directions. Do not use these gels for children who are younger than 2 years of age.  Do not drive or use heavy machinery while taking prescription pain medicine.  General instructions     Rinse out your mouth often with salt water to relieve pain or swelling. To make a salt-water mixture, completely dissolve ½–1 tsp of salt in 1 cup of warm water.  Eat a soft diet while your abscess is healing.  Drink enough fluid to keep your urine pale yellow.  Do not apply heat to the outside of your mouth.  Do not use any products that contain nicotine or tobacco, such as cigarettes and e-cigarettes. If you need help quitting, ask your health care provider.  Keep all follow-up visits as told by your dentist. This is important.  How is this prevented?  Brush your teeth every morning and night with fluoride toothpaste. Floss one time each day.  Get regularly scheduled dental cleanings.  Consider having a dental sealant applied on teeth that have deep holes (caries).  Drink fluoridated water regularly. This includes most tap water. Check the label on bottled water to see if it contains fluoride.  Drink water instead of sugary drinks.  Eat healthy meals and snacks.  Wear a mouth guard or face shield to protect your teeth while playing sports.  Contact a health care provider if:  Your pain is worse and is not helped by medicine.  Get help right away if:  You have a fever or chills.  Your symptoms suddenly get worse.  You have a very bad headache.  You have problems breathing or swallowing.  You have trouble opening your mouth.  You have swelling in your neck or around your eye.  Summary  A dental abscess is a collection of pus in or around a tooth that results from an infection.  A dental abscess may result from severe tooth decay, trauma to the tooth, or severe gum disease around a tooth.  Symptoms include severe pain, swelling, redness, and drainage of pus in and around the infected tooth.  The first priority in treating a dental abscess is to drain out the pus. Treatment may also involve removing damage inside the tooth (root canal) or pulling out (extracting) the tooth.  This information is not intended to replace advice given to you by your health care provider. Make sure you discuss any questions you have with your health care provider.

## 2025-03-31 NOTE — ED PROVIDER NOTE - CLINICAL SUMMARY MEDICAL DECISION MAKING FREE TEXT BOX
64 yom with history of HTN, HLD, DM2, hypothyroidism presents due to right lower dental pain for one week, no trauma or fever or chills able to tolerate orally. exam no dental abscess given oral abx and dc with outpatient dental clinics follow up

## 2025-03-31 NOTE — ED PROVIDER NOTE - PHYSICAL EXAMINATION
CONSTITUTIONAL: Well developed; in no acute distress  HEAD: Normocephalic; atraumatic  EYES: No conjunctival injection, no scleral icterus  ENT:No nasal discharge; airway clear. no dental abscess   NECK: Supple; non tender.  CARD: Warm and well perfused, not tachycardic  RESP: Breathing comfortably on RA, speaking in full sentences w/o distress  Abdomen: Soft, non-tender, non-distended   EXT: No gross deformities, normal ROM.  SKIN: Warm and dry  NEURO: Alert, oriented, motor and sensory grossly intact  PSYCH: Calm, cooperative

## 2025-03-31 NOTE — ED PROVIDER NOTE - PATIENT PORTAL LINK FT
You can access the FollowMyHealth Patient Portal offered by [facility] by registering at the following website: [email or web address]. By joining [facility]’s FollowMyHealth portal, you will also be able to view your health information using other applications (apps) compatible with our system.

## 2025-03-31 NOTE — ED ADULT NURSE NOTE - CAS DISCH BELONGINGS RETURNED
Please continue ibuprofen every 6-8 hours as needed for pain control.  She may also take Norco as needed for breakthrough pain.  Please consider using a stool softener in the meantime.  Please follow-up with her ophthalmologist in 4 days.  You may also to follow-up with your pediatrician in 2 days for 48-hour follow-up.  Please return to emergency department for any sudden vision changes, uncontrolled pain, or fever.  
Not applicable

## 2025-04-24 ENCOUNTER — APPOINTMENT (OUTPATIENT)
Dept: UROLOGY | Facility: CLINIC | Age: 65
End: 2025-04-24
Payer: COMMERCIAL

## 2025-04-24 VITALS
DIASTOLIC BLOOD PRESSURE: 81 MMHG | WEIGHT: 231 LBS | HEIGHT: 67 IN | OXYGEN SATURATION: 95 % | RESPIRATION RATE: 18 BRPM | BODY MASS INDEX: 36.26 KG/M2 | SYSTOLIC BLOOD PRESSURE: 173 MMHG | HEART RATE: 71 BPM

## 2025-04-24 DIAGNOSIS — B35.6 TINEA CRURIS: ICD-10-CM

## 2025-04-24 PROCEDURE — 99213 OFFICE O/P EST LOW 20 MIN: CPT

## 2025-04-28 ENCOUNTER — APPOINTMENT (OUTPATIENT)
Dept: UROLOGY | Facility: CLINIC | Age: 65
End: 2025-04-28
Payer: COMMERCIAL

## 2025-04-28 DIAGNOSIS — N52.9 MALE ERECTILE DYSFUNCTION, UNSPECIFIED: ICD-10-CM

## 2025-04-28 DIAGNOSIS — E29.1 TESTICULAR HYPOFUNCTION: ICD-10-CM

## 2025-04-28 PROCEDURE — 99214 OFFICE O/P EST MOD 30 MIN: CPT | Mod: 95

## 2025-08-09 ENCOUNTER — NON-APPOINTMENT (OUTPATIENT)
Age: 65
End: 2025-08-09

## 2025-08-11 ENCOUNTER — APPOINTMENT (OUTPATIENT)
Dept: UROLOGY | Facility: CLINIC | Age: 65
End: 2025-08-11
Payer: COMMERCIAL

## 2025-08-11 VITALS
BODY MASS INDEX: 36.26 KG/M2 | WEIGHT: 231 LBS | HEIGHT: 67 IN | HEART RATE: 72 BPM | DIASTOLIC BLOOD PRESSURE: 71 MMHG | TEMPERATURE: 98 F | RESPIRATION RATE: 18 BRPM | SYSTOLIC BLOOD PRESSURE: 147 MMHG | OXYGEN SATURATION: 96 %

## 2025-08-11 DIAGNOSIS — E34.8 OTHER SPECIFIED ENDOCRINE DISORDERS: ICD-10-CM

## 2025-08-11 DIAGNOSIS — E29.1 TESTICULAR HYPOFUNCTION: ICD-10-CM

## 2025-08-11 PROCEDURE — G2211 COMPLEX E/M VISIT ADD ON: CPT

## 2025-08-11 PROCEDURE — 99214 OFFICE O/P EST MOD 30 MIN: CPT
